# Patient Record
Sex: MALE | Race: WHITE | NOT HISPANIC OR LATINO | Employment: FULL TIME | URBAN - METROPOLITAN AREA
[De-identification: names, ages, dates, MRNs, and addresses within clinical notes are randomized per-mention and may not be internally consistent; named-entity substitution may affect disease eponyms.]

---

## 2017-03-14 DIAGNOSIS — I10 ESSENTIAL HYPERTENSION: ICD-10-CM

## 2017-03-15 DIAGNOSIS — I10 ESSENTIAL HYPERTENSION: ICD-10-CM

## 2017-03-15 RX ORDER — AMLODIPINE BESYLATE 5 MG/1
TABLET ORAL
Qty: 30 TABLET | Refills: 0 | Status: SHIPPED | OUTPATIENT
Start: 2017-03-15 | End: 2017-04-17 | Stop reason: SDUPTHER

## 2017-03-15 RX ORDER — AMLODIPINE BESYLATE 5 MG/1
TABLET ORAL
Qty: 90 TABLET | Refills: 0 | OUTPATIENT
Start: 2017-03-15

## 2017-04-17 DIAGNOSIS — I10 ESSENTIAL HYPERTENSION: ICD-10-CM

## 2017-04-18 RX ORDER — AMLODIPINE BESYLATE 5 MG/1
5 TABLET ORAL DAILY
Qty: 15 TABLET | Refills: 0 | Status: SHIPPED | OUTPATIENT
Start: 2017-04-18 | End: 2017-05-11 | Stop reason: SDUPTHER

## 2017-05-11 ENCOUNTER — OFFICE VISIT (OUTPATIENT)
Dept: INTERNAL MEDICINE | Age: 62
End: 2017-05-11

## 2017-05-11 VITALS
HEART RATE: 60 BPM | HEIGHT: 72 IN | OXYGEN SATURATION: 98 % | WEIGHT: 164 LBS | DIASTOLIC BLOOD PRESSURE: 80 MMHG | SYSTOLIC BLOOD PRESSURE: 124 MMHG | BODY MASS INDEX: 22.21 KG/M2

## 2017-05-11 DIAGNOSIS — I10 ESSENTIAL HYPERTENSION: Primary | Chronic | ICD-10-CM

## 2017-05-11 DIAGNOSIS — K51.90 ULCERATIVE COLITIS WITHOUT COMPLICATIONS, UNSPECIFIED LOCATION (HCC): Chronic | ICD-10-CM

## 2017-05-11 LAB
BUN SERPL-MCNC: 19 MG/DL (ref 6–20)
BUN/CREAT SERPL: 14.6 (ref 7–25)
CALCIUM SERPL-MCNC: 9.8 MG/DL (ref 8.6–10.5)
CHLORIDE SERPL-SCNC: 100 MMOL/L (ref 98–107)
CO2 SERPL-SCNC: 28.8 MMOL/L (ref 22–29)
CREAT SERPL-MCNC: 1.3 MG/DL (ref 0.76–1.27)
GLUCOSE SERPL-MCNC: 89 MG/DL (ref 65–99)
POTASSIUM SERPL-SCNC: 4.3 MMOL/L (ref 3.5–5.2)
SODIUM SERPL-SCNC: 141 MMOL/L (ref 136–145)

## 2017-05-11 PROCEDURE — 99213 OFFICE O/P EST LOW 20 MIN: CPT | Performed by: INTERNAL MEDICINE

## 2017-05-11 RX ORDER — AMLODIPINE BESYLATE 5 MG/1
5 TABLET ORAL DAILY
Qty: 30 TABLET | Refills: 5 | Status: SHIPPED | OUTPATIENT
Start: 2017-05-11 | End: 2017-10-23 | Stop reason: SDUPTHER

## 2017-05-12 ENCOUNTER — TELEPHONE (OUTPATIENT)
Dept: INTERNAL MEDICINE | Age: 62
End: 2017-05-12

## 2017-10-23 DIAGNOSIS — I10 ESSENTIAL HYPERTENSION: Chronic | ICD-10-CM

## 2017-10-24 RX ORDER — AMLODIPINE BESYLATE 5 MG/1
TABLET ORAL
Qty: 90 TABLET | Refills: 0 | Status: SHIPPED | OUTPATIENT
Start: 2017-10-24 | End: 2018-08-08 | Stop reason: SDUPTHER

## 2017-11-20 DIAGNOSIS — I10 ESSENTIAL HYPERTENSION: Chronic | ICD-10-CM

## 2017-11-20 RX ORDER — AMLODIPINE BESYLATE 5 MG/1
5 TABLET ORAL DAILY
Qty: 30 TABLET | Refills: 0 | Status: SHIPPED | OUTPATIENT
Start: 2017-11-20 | End: 2018-01-26 | Stop reason: SDUPTHER

## 2017-11-20 RX ORDER — AMLODIPINE BESYLATE 5 MG/1
5 TABLET ORAL DAILY
Qty: 30 TABLET | Refills: 0 | Status: SHIPPED | OUTPATIENT
Start: 2017-11-20 | End: 2017-11-20 | Stop reason: SDUPTHER

## 2018-01-26 DIAGNOSIS — I10 ESSENTIAL HYPERTENSION: Chronic | ICD-10-CM

## 2018-01-26 RX ORDER — AMLODIPINE BESYLATE 5 MG/1
TABLET ORAL
Qty: 30 TABLET | Refills: 0 | Status: SHIPPED | OUTPATIENT
Start: 2018-01-26 | End: 2018-02-23 | Stop reason: SDUPTHER

## 2018-02-23 DIAGNOSIS — I10 ESSENTIAL HYPERTENSION: Chronic | ICD-10-CM

## 2018-02-23 RX ORDER — AMLODIPINE BESYLATE 5 MG/1
5 TABLET ORAL DAILY
Qty: 30 TABLET | Refills: 0 | Status: SHIPPED | OUTPATIENT
Start: 2018-02-23 | End: 2018-03-26 | Stop reason: SDUPTHER

## 2018-03-26 DIAGNOSIS — I10 ESSENTIAL HYPERTENSION: Chronic | ICD-10-CM

## 2018-03-27 RX ORDER — AMLODIPINE BESYLATE 5 MG/1
TABLET ORAL
Qty: 30 TABLET | Refills: 0 | Status: SHIPPED | OUTPATIENT
Start: 2018-03-27 | End: 2018-04-25 | Stop reason: SDUPTHER

## 2018-04-25 DIAGNOSIS — I10 ESSENTIAL HYPERTENSION: Chronic | ICD-10-CM

## 2018-04-26 RX ORDER — AMLODIPINE BESYLATE 5 MG/1
TABLET ORAL
Qty: 30 TABLET | Refills: 0 | Status: SHIPPED | OUTPATIENT
Start: 2018-04-26 | End: 2018-05-24 | Stop reason: SDUPTHER

## 2018-05-24 DIAGNOSIS — I10 ESSENTIAL HYPERTENSION: Chronic | ICD-10-CM

## 2018-05-25 DIAGNOSIS — I10 ESSENTIAL HYPERTENSION: Chronic | ICD-10-CM

## 2018-05-25 RX ORDER — AMLODIPINE BESYLATE 5 MG/1
TABLET ORAL
Qty: 30 TABLET | Refills: 0 | Status: SHIPPED | OUTPATIENT
Start: 2018-05-25 | End: 2018-07-31 | Stop reason: SDUPTHER

## 2018-05-25 RX ORDER — AMLODIPINE BESYLATE 5 MG/1
TABLET ORAL
Qty: 30 TABLET | Refills: 0 | Status: SHIPPED | OUTPATIENT
Start: 2018-05-25 | End: 2018-05-25 | Stop reason: SDUPTHER

## 2018-06-11 VITALS
RESPIRATION RATE: 13 BRPM | HEART RATE: 78 BPM | SYSTOLIC BLOOD PRESSURE: 117 MMHG | TEMPERATURE: 98.2 F | RESPIRATION RATE: 18 BRPM | HEART RATE: 69 BPM | RESPIRATION RATE: 19 BRPM | RESPIRATION RATE: 12 BRPM | DIASTOLIC BLOOD PRESSURE: 68 MMHG | DIASTOLIC BLOOD PRESSURE: 69 MMHG | DIASTOLIC BLOOD PRESSURE: 79 MMHG | TEMPERATURE: 97.5 F | RESPIRATION RATE: 17 BRPM | HEART RATE: 70 BPM | DIASTOLIC BLOOD PRESSURE: 83 MMHG | DIASTOLIC BLOOD PRESSURE: 77 MMHG | HEART RATE: 76 BPM | DIASTOLIC BLOOD PRESSURE: 82 MMHG | SYSTOLIC BLOOD PRESSURE: 121 MMHG | SYSTOLIC BLOOD PRESSURE: 127 MMHG | HEART RATE: 77 BPM | HEART RATE: 71 BPM | OXYGEN SATURATION: 99 % | OXYGEN SATURATION: 100 % | OXYGEN SATURATION: 95 % | SYSTOLIC BLOOD PRESSURE: 113 MMHG | SYSTOLIC BLOOD PRESSURE: 136 MMHG | HEART RATE: 66 BPM | SYSTOLIC BLOOD PRESSURE: 122 MMHG | SYSTOLIC BLOOD PRESSURE: 108 MMHG | HEART RATE: 72 BPM | RESPIRATION RATE: 14 BRPM | SYSTOLIC BLOOD PRESSURE: 103 MMHG | SYSTOLIC BLOOD PRESSURE: 105 MMHG | DIASTOLIC BLOOD PRESSURE: 65 MMHG | DIASTOLIC BLOOD PRESSURE: 80 MMHG | OXYGEN SATURATION: 96 % | HEART RATE: 75 BPM | DIASTOLIC BLOOD PRESSURE: 71 MMHG | DIASTOLIC BLOOD PRESSURE: 70 MMHG | RESPIRATION RATE: 16 BRPM | OXYGEN SATURATION: 97 % | HEIGHT: 72 IN | SYSTOLIC BLOOD PRESSURE: 106 MMHG | SYSTOLIC BLOOD PRESSURE: 111 MMHG | WEIGHT: 161 LBS | SYSTOLIC BLOOD PRESSURE: 137 MMHG

## 2018-06-13 ENCOUNTER — AMBULATORY SURGICAL CENTER (OUTPATIENT)
Dept: URBAN - METROPOLITAN AREA SURGERY 17 | Facility: SURGERY | Age: 63
End: 2018-06-13
Payer: COMMERCIAL

## 2018-06-13 ENCOUNTER — OFFICE (OUTPATIENT)
Dept: URBAN - METROPOLITAN AREA PATHOLOGY 4 | Facility: PATHOLOGY | Age: 63
End: 2018-06-13
Payer: COMMERCIAL

## 2018-06-13 DIAGNOSIS — K51.00 ULCERATIVE (CHRONIC) PANCOLITIS WITHOUT COMPLICATIONS: ICD-10-CM

## 2018-06-13 DIAGNOSIS — K62.1 RECTAL POLYP: ICD-10-CM

## 2018-06-13 DIAGNOSIS — Z86.010 PERSONAL HISTORY OF COLONIC POLYPS: ICD-10-CM

## 2018-06-13 DIAGNOSIS — K51.40 INFLAMMATORY POLYPS OF COLON WITHOUT COMPLICATIONS: ICD-10-CM

## 2018-06-13 LAB
GI HISTOLOGY: A. UNSPECIFIED: (no result)
GI HISTOLOGY: B. UNSPECIFIED: (no result)
GI HISTOLOGY: C. UNSPECIFIED: (no result)
GI HISTOLOGY: D. UNSPECIFIED: (no result)
GI HISTOLOGY: E. UNSPECIFIED: (no result)
GI HISTOLOGY: F. UNSPECIFIED: (no result)
GI HISTOLOGY: G. UNSPECIFIED: (no result)
GI HISTOLOGY: H. UNSPECIFIED: (no result)
GI HISTOLOGY: I. UNSPECIFIED: (no result)
GI HISTOLOGY: J. UNSPECIFIED: (no result)
GI HISTOLOGY: K. SELECT: (no result)
GI HISTOLOGY: L. UNSPECIFIED: (no result)
GI HISTOLOGY: M. UNSPECIFIED: (no result)
GI HISTOLOGY: PDF REPORT: (no result)

## 2018-06-13 PROCEDURE — 88305 TISSUE EXAM BY PATHOLOGIST: CPT | Performed by: INTERNAL MEDICINE

## 2018-06-13 PROCEDURE — 45380 COLONOSCOPY AND BIOPSY: CPT | Mod: 33 | Performed by: INTERNAL MEDICINE

## 2018-06-13 RX ADMIN — PROPOFOL 50 MG: 10 INJECTION, EMULSION INTRAVENOUS at 13:34

## 2018-06-13 RX ADMIN — PROPOFOL 150 MG: 10 INJECTION, EMULSION INTRAVENOUS at 13:27

## 2018-06-13 RX ADMIN — PROPOFOL 50 MG: 10 INJECTION, EMULSION INTRAVENOUS at 13:31

## 2018-06-13 RX ADMIN — PROPOFOL 50 MG: 10 INJECTION, EMULSION INTRAVENOUS at 13:45

## 2018-07-31 DIAGNOSIS — I10 ESSENTIAL HYPERTENSION: Chronic | ICD-10-CM

## 2018-08-01 RX ORDER — AMLODIPINE BESYLATE 5 MG/1
TABLET ORAL
Qty: 15 TABLET | Refills: 0 | Status: SHIPPED | OUTPATIENT
Start: 2018-08-01 | End: 2018-08-08 | Stop reason: SDUPTHER

## 2018-08-08 ENCOUNTER — OFFICE VISIT (OUTPATIENT)
Dept: INTERNAL MEDICINE | Age: 63
End: 2018-08-08

## 2018-08-08 VITALS
SYSTOLIC BLOOD PRESSURE: 110 MMHG | HEIGHT: 72 IN | BODY MASS INDEX: 22.08 KG/M2 | HEART RATE: 62 BPM | DIASTOLIC BLOOD PRESSURE: 74 MMHG | OXYGEN SATURATION: 98 % | TEMPERATURE: 98.1 F | WEIGHT: 163 LBS

## 2018-08-08 DIAGNOSIS — K51.90 ULCERATIVE COLITIS WITHOUT COMPLICATIONS, UNSPECIFIED LOCATION (HCC): Chronic | ICD-10-CM

## 2018-08-08 DIAGNOSIS — I10 ESSENTIAL HYPERTENSION: Primary | Chronic | ICD-10-CM

## 2018-08-08 LAB
ALBUMIN SERPL-MCNC: 5.2 G/DL (ref 3.5–5.2)
ALBUMIN/GLOB SERPL: 2.7 G/DL
ALP SERPL-CCNC: 54 U/L (ref 39–117)
ALT SERPL-CCNC: 15 U/L (ref 1–41)
AST SERPL-CCNC: 13 U/L (ref 1–40)
BILIRUB SERPL-MCNC: 0.7 MG/DL (ref 0.1–1.2)
BUN SERPL-MCNC: 17 MG/DL (ref 8–23)
BUN/CREAT SERPL: 13.8 (ref 7–25)
CALCIUM SERPL-MCNC: 10.3 MG/DL (ref 8.6–10.5)
CHLORIDE SERPL-SCNC: 100 MMOL/L (ref 98–107)
CO2 SERPL-SCNC: 29.8 MMOL/L (ref 22–29)
CREAT SERPL-MCNC: 1.23 MG/DL (ref 0.76–1.27)
GLOBULIN SER CALC-MCNC: 1.9 GM/DL
GLUCOSE SERPL-MCNC: 87 MG/DL (ref 65–99)
POTASSIUM SERPL-SCNC: 4 MMOL/L (ref 3.5–5.2)
PROT SERPL-MCNC: 7.1 G/DL (ref 6–8.5)
SODIUM SERPL-SCNC: 141 MMOL/L (ref 136–145)

## 2018-08-08 PROCEDURE — 99213 OFFICE O/P EST LOW 20 MIN: CPT | Performed by: INTERNAL MEDICINE

## 2018-08-08 RX ORDER — AMLODIPINE BESYLATE 5 MG/1
5 TABLET ORAL DAILY
Qty: 30 TABLET | Refills: 5 | Status: SHIPPED | OUTPATIENT
Start: 2018-08-08 | End: 2018-12-18 | Stop reason: SDUPTHER

## 2018-08-08 NOTE — PROGRESS NOTES
"[facility] [place] [facility]  BERT PENNINGTON M.D.      Alvin Cruz / 62 y.o. / male  08/08/2018      ASSESSMENT & PLAN:    Problem List Items Addressed This Visit        High    Hypertension - Primary (Chronic)    Overview     Stable. Continue amlodipine 5 mg qd.          Relevant Medications    amLODIPine (NORVASC) 5 MG tablet    Other Relevant Orders    Comprehensive Metabolic Panel       Medium    Ulcerative colitis (CMS/HCC) (Chronic)    Overview     *Dr. Ashely Long. Continue mercaptopurine.              Orders Placed This Encounter   Procedures   • Comprehensive Metabolic Panel     New Medications Ordered This Visit   Medications   • amLODIPine (NORVASC) 5 MG tablet     Sig: Take 1 tablet by mouth Daily.     Dispense:  30 tablet     Refill:  5       Summary/Discussion:      Return in about 4 months (around 12/8/2018) for Annual physical.    ____________________________________________________________________    MEDICATIONS  Current Outpatient Prescriptions   Medication Sig Dispense Refill   • amLODIPine (NORVASC) 5 MG tablet Take 1 tablet by mouth Daily. 30 tablet 5   • Calcium Carbonate-Vitamin D (CALCIUM 600+D3 PO) Take  by mouth.     • cetirizine (ZyrTEC) 10 MG tablet Take 0.5 tablets by mouth daily.     • Cholecalciferol 2000 UNITS capsule Take  by mouth Daily.     • mercaptopurine (PURINETHOL) 50 MG chemo tablet Take 1.5 tablets by mouth daily.     • Probiotic Product (SUPER PROBIOTIC) capsule Take  by mouth.       No current facility-administered medications for this visit.          VITALS:    Visit Vitals  /74 (BP Location: Left arm, Patient Position: Sitting, Cuff Size: Adult)   Pulse 62   Temp 98.1 °F (36.7 °C) (Temporal Artery )   Ht 182.9 cm (72\")   Wt 73.9 kg (163 lb)   SpO2 98%   BMI 22.11 kg/m²       BP Readings from Last 3 Encounters:   08/08/18 110/74   05/11/17 124/80   09/29/16 128/70     Wt Readings from Last 3 Encounters:   08/08/18 73.9 kg (163 lb)   05/11/17 74.4 kg (164 lb) "   09/29/16 77.1 kg (170 lb)      Body mass index is 22.11 kg/m².    CC:  Main reason(s) for today's visit: Hypertension      HPI:     Chronic essential hypertension:  Since prior visit: compliant with medication(s) and denies significant problems with medication(s).  Most recent in-office blood pressure readings:   BP Readings from Last 3 Encounters:   08/08/18 110/74   05/11/17 124/80   09/29/16 128/70     Ulcerative colitis is stable on mercaptopurine. Followed by GI.     Patient Care Team:  Jcarlos Tucker MD as PCP - General  Donnell Lund MD as Consulting Physician (Gastroenterology)    ____________________________________________________________________    REVIEW OF SYSTEMS    Review of Systems  Constitutional neg  Resp neg  CV neg  GI UC is stable  Neuro neg    PHYSICAL EXAMINATION    Physical Exam  Constitutional  No distress  Cardiovascular Rate  normal . Rhythm: regular . Heart sounds:  Normal. No edema of ankles.   Pulmonary/Chest  Effort normal. Breath sounds:  Normal  Psychiatric  Alert. Judgment and thought content normal. Mood normal    REVIEWED DATA:    Labs:     Lab Results   Component Value Date     05/11/2017    K 4.3 05/11/2017    AST 15 02/01/2016    ALT 14 02/01/2016    BUN 19 05/11/2017    CREATININE 1.30 (H) 05/11/2017    CREATININE 1.20 02/01/2016    EGFRIFNONA 57 (L) 05/11/2017    EGFRIFAFRI 68 05/11/2017       Lab Results   Component Value Date    HGBA1C 5.2 02/01/2016       Lab Results   Component Value Date    LDL 80 02/01/2016    HDL 58 02/01/2016    TRIG 62 02/01/2016    CHOLHDLRATIO 2.6 02/01/2016       Lab Results   Component Value Date    TSH 2.43 02/01/2016    FREET4 1.17 02/01/2016       Lab Results   Component Value Date    WBC 3.85 (L) 02/01/2016    HGB 15.8 02/01/2016     02/01/2016       Imaging:         Medical Tests:         Summary of old records / correspondence / consultant report:         Request outside records:         ALLERGIES  Allergies   Allergen  Reactions   • Codeine    • Infliximab         PFSH:     The following portions of the patient's history were reviewed and updated as appropriate: Allergies / Current Medications / Past Medical History / Surgical History / Social History / Family History    PROBLEM LIST   Patient Active Problem List   Diagnosis   • Atopic rhinitis   • Hypertension   • Insomnia   • Ulcerative colitis (CMS/HCC)       PAST MEDICAL HISTORY  Past Medical History:   Diagnosis Date   • Allergic rhinitis    • Dyslipidemia    • Hypertension        SURGICAL HISTORY  Past Surgical History:   Procedure Laterality Date   • APPENDECTOMY         SOCIAL HISTORY  Social History     Social History   • Marital status: Single   • Number of children: 0     Occupational History   •  (Online)      Social History Main Topics   • Smoking status: Former Smoker     Years: 12.00     Quit date: 1/1/1988   • Smokeless tobacco: Never Used   • Alcohol use Yes      Comment: occasional   • Drug use: No   • Sexual activity: Yes     Other Topics Concern   • Not on file       FAMILY HISTORY  Family History   Problem Relation Age of Onset   • Heart attack Mother    • Coronary artery disease Mother    • Diabetes Mother    • Hypertension Father    • Parkinsonism Father    • Multiple myeloma Brother          **Dragon Disclaimer:   Much of this encounter note is an electronic transcription/translation of spoken language to printed text. The electronic translation of spoken language may permit erroneous, or at times, nonsensical words or phrases to be inadvertently transcribed. Although I have reviewed the note for such errors, some may still exist.

## 2018-12-18 ENCOUNTER — OFFICE (OUTPATIENT)
Dept: URBAN - METROPOLITAN AREA CLINIC 75 | Facility: CLINIC | Age: 63
End: 2018-12-18

## 2018-12-18 VITALS
WEIGHT: 170 LBS | HEIGHT: 72 IN | DIASTOLIC BLOOD PRESSURE: 80 MMHG | HEART RATE: 75 BPM | SYSTOLIC BLOOD PRESSURE: 120 MMHG

## 2018-12-18 DIAGNOSIS — K51.90 ULCERATIVE COLITIS, UNSPECIFIED, WITHOUT COMPLICATIONS: ICD-10-CM

## 2018-12-18 DIAGNOSIS — Z92.25 PERSONAL HISTORY OF IMMUNOSUPPRESSION THERAPY: ICD-10-CM

## 2018-12-18 DIAGNOSIS — Z86.010 PERSONAL HISTORY OF COLONIC POLYPS: ICD-10-CM

## 2018-12-18 DIAGNOSIS — I10 ESSENTIAL HYPERTENSION: Chronic | ICD-10-CM

## 2018-12-18 PROCEDURE — 99214 OFFICE O/P EST MOD 30 MIN: CPT | Performed by: INTERNAL MEDICINE

## 2018-12-19 RX ORDER — AMLODIPINE BESYLATE 5 MG/1
5 TABLET ORAL DAILY
Qty: 90 TABLET | Refills: 2 | Status: SHIPPED | OUTPATIENT
Start: 2018-12-19 | End: 2019-09-17 | Stop reason: SDUPTHER

## 2019-02-01 DIAGNOSIS — Z00.00 PREVENTATIVE HEALTH CARE: Primary | ICD-10-CM

## 2019-02-05 LAB
ALBUMIN SERPL-MCNC: 5 G/DL (ref 3.5–5.2)
ALBUMIN/GLOB SERPL: 2.4 G/DL
ALP SERPL-CCNC: 50 U/L (ref 39–117)
ALT SERPL-CCNC: 18 U/L (ref 1–41)
APPEARANCE UR: CLEAR
AST SERPL-CCNC: 17 U/L (ref 1–40)
BASOPHILS # BLD AUTO: 0.06 10*3/MM3 (ref 0–0.2)
BASOPHILS NFR BLD AUTO: 1.3 % (ref 0–1.5)
BILIRUB SERPL-MCNC: 0.6 MG/DL (ref 0.1–1.2)
BILIRUB UR QL STRIP: NEGATIVE
BUN SERPL-MCNC: 15 MG/DL (ref 8–23)
BUN/CREAT SERPL: 11.6 (ref 7–25)
CALCIUM SERPL-MCNC: 10.1 MG/DL (ref 8.6–10.5)
CHLORIDE SERPL-SCNC: 98 MMOL/L (ref 98–107)
CHOLEST SERPL-MCNC: 157 MG/DL (ref 0–200)
CHOLEST/HDLC SERPL: 3.02 {RATIO}
CO2 SERPL-SCNC: 26.3 MMOL/L (ref 22–29)
COLOR UR: YELLOW
CREAT SERPL-MCNC: 1.29 MG/DL (ref 0.76–1.27)
EOSINOPHIL # BLD AUTO: 0.23 10*3/MM3 (ref 0–0.7)
EOSINOPHIL NFR BLD AUTO: 4.9 % (ref 0.3–6.2)
ERYTHROCYTE [DISTWIDTH] IN BLOOD BY AUTOMATED COUNT: 12.7 % (ref 11.5–14.5)
GLOBULIN SER CALC-MCNC: 2.1 GM/DL
GLUCOSE SERPL-MCNC: 87 MG/DL (ref 65–99)
GLUCOSE UR QL: NEGATIVE
HBA1C MFR BLD: 5.2 % (ref 4.8–5.6)
HCT VFR BLD AUTO: 49.2 % (ref 40.4–52.2)
HCV AB S/CO SERPL IA: <0.1 S/CO RATIO (ref 0–0.9)
HDLC SERPL-MCNC: 52 MG/DL (ref 40–60)
HGB BLD-MCNC: 16.6 G/DL (ref 13.7–17.6)
HGB UR QL STRIP: NEGATIVE
IMM GRANULOCYTES # BLD AUTO: 0.02 10*3/MM3 (ref 0–0.03)
IMM GRANULOCYTES NFR BLD AUTO: 0.4 % (ref 0–0.5)
KETONES UR QL STRIP: NEGATIVE
LDLC SERPL CALC-MCNC: 87 MG/DL (ref 0–100)
LEUKOCYTE ESTERASE UR QL STRIP: NEGATIVE
LYMPHOCYTES # BLD AUTO: 1.34 10*3/MM3 (ref 0.9–4.8)
LYMPHOCYTES NFR BLD AUTO: 28.6 % (ref 19.6–45.3)
MCH RBC QN AUTO: 32.7 PG (ref 27–32.7)
MCHC RBC AUTO-ENTMCNC: 33.7 G/DL (ref 32.6–36.4)
MCV RBC AUTO: 97 FL (ref 79.8–96.2)
MONOCYTES # BLD AUTO: 0.58 10*3/MM3 (ref 0.2–1.2)
MONOCYTES NFR BLD AUTO: 12.4 % (ref 5–12)
NEUTROPHILS # BLD AUTO: 2.48 10*3/MM3 (ref 1.9–8.1)
NEUTROPHILS NFR BLD AUTO: 52.8 % (ref 42.7–76)
NITRITE UR QL STRIP: NEGATIVE
PH UR STRIP: 7 [PH] (ref 5–8)
PLATELET # BLD AUTO: 191 10*3/MM3 (ref 140–500)
POTASSIUM SERPL-SCNC: 4.6 MMOL/L (ref 3.5–5.2)
PROT SERPL-MCNC: 7.1 G/DL (ref 6–8.5)
PROT UR QL STRIP: NEGATIVE
PSA SERPL-MCNC: 1.4 NG/ML (ref 0–4)
RBC # BLD AUTO: 5.07 10*6/MM3 (ref 4.6–6)
SODIUM SERPL-SCNC: 137 MMOL/L (ref 136–145)
SP GR UR: NORMAL (ref 1–1.03)
T4 FREE SERPL-MCNC: 1.24 NG/DL (ref 0.93–1.7)
TRIGL SERPL-MCNC: 90 MG/DL (ref 0–150)
TSH SERPL DL<=0.005 MIU/L-ACNC: 3.69 MIU/ML (ref 0.27–4.2)
UROBILINOGEN UR STRIP-MCNC: NORMAL MG/DL
VLDLC SERPL CALC-MCNC: 18 MG/DL (ref 5–40)
WBC # BLD AUTO: 4.69 10*3/MM3 (ref 4.5–10.7)

## 2019-02-11 ENCOUNTER — OFFICE VISIT (OUTPATIENT)
Dept: INTERNAL MEDICINE | Age: 64
End: 2019-02-11

## 2019-02-11 VITALS
DIASTOLIC BLOOD PRESSURE: 74 MMHG | OXYGEN SATURATION: 97 % | SYSTOLIC BLOOD PRESSURE: 120 MMHG | BODY MASS INDEX: 23.03 KG/M2 | WEIGHT: 170 LBS | HEART RATE: 74 BPM | TEMPERATURE: 98.3 F | HEIGHT: 72 IN

## 2019-02-11 DIAGNOSIS — Z00.00 ENCOUNTER FOR ANNUAL HEALTH EXAMINATION: Primary | ICD-10-CM

## 2019-02-11 DIAGNOSIS — K51.90 ULCERATIVE COLITIS WITHOUT COMPLICATIONS, UNSPECIFIED LOCATION (HCC): Chronic | ICD-10-CM

## 2019-02-11 DIAGNOSIS — I10 ESSENTIAL HYPERTENSION: Chronic | ICD-10-CM

## 2019-02-11 PROCEDURE — 99396 PREV VISIT EST AGE 40-64: CPT | Performed by: INTERNAL MEDICINE

## 2019-02-11 NOTE — PROGRESS NOTES
[facility] [place] [facility]  BERT PENNINGTON M.D.      Alvin Barreraring / 63 y.o. / male  02/11/2019    CC: Annual Exam (last 3/16/2016)      HPI:      Alvin presents for annual health maintenance visit.  Chronic essential hypertension:  Since prior visit: compliant with medication(s) and denies significant problems with medication(s).  Most recent in-office blood pressure readings:   BP Readings from Last 3 Encounters:   02/11/19 120/74   08/08/18 110/74   05/11/17 124/80      · Last health maintenance visit: 3 years ago  · General health: good  · Lifestyle:  · Attempting to lose weight?: No   · Diet: adequate/fair  · Exercise: adequate/fair  · Tobacco: Former smoker   · Alcohol: regular (moderate)  · Work: Full-time  · Reproductive health:  · Sexually active?: Yes   · Concern for STD?: No   · Sexual problems?: No problems   · Sees Urologist?: No   · Depression Screening:      PHQ-2/PHQ-9 Depression Screening 2/11/2019   Little interest or pleasure in doing things 0   Feeling down, depressed, or hopeless 0   Total Score 0   If you checked off any problems, how difficult have these problems made it for you to do your work, take care of things at home, or get along with other people? -         PHQ-2: 0 (Not depressed)     PHQ-9:     Patient Care Team:  Jcarlos Pennington MD as PCP - General  Donnell Lund MD as Consulting Physician (Gastroenterology)  ______________________________________________________________________    ALLERGIES  Allergies   Allergen Reactions   • Codeine    • Infliximab         MEDICATIONS  Current Outpatient Medications on File Prior to Visit   Medication Sig   • amLODIPine (NORVASC) 5 MG tablet TAKE 1 TABLET BY MOUTH DAILY   • Calcium Carbonate-Vitamin D (CALCIUM 600+D3 PO) Take  by mouth.   • cetirizine (ZyrTEC) 10 MG tablet Take 0.5 tablets by mouth daily.   • Cholecalciferol 2000 UNITS capsule Take  by mouth Daily.   • mercaptopurine (PURINETHOL) 50 MG chemo tablet Take 1.5 tablets by mouth daily.   •  Probiotic Product (SUPER PROBIOTIC) capsule Take  by mouth.     No current facility-administered medications on file prior to visit.        PFSH:     The following portions of the patient's history were reviewed and updated as appropriate: Allergies / Current Medications / Past Medical History / Surgical History / Social History / Family History    PROBLEM LIST   Patient Active Problem List   Diagnosis   • Atopic rhinitis   • Hypertension   • Insomnia   • Ulcerative colitis (CMS/HCC)       PAST MEDICAL HISTORY  Past Medical History:   Diagnosis Date   • Allergic rhinitis    • Dyslipidemia    • Hypertension        SURGICAL HISTORY  Past Surgical History:   Procedure Laterality Date   • APPENDECTOMY         SOCIAL HISTORY  Social History     Socioeconomic History   • Marital status: Single     Spouse name: Not on file   • Number of children: 0   • Years of education: Not on file   • Highest education level: Not on file   Occupational History   • Occupation:  (Online)   Tobacco Use   • Smoking status: Former Smoker     Years: 12.     Last attempt to quit: 1988     Years since quittin.   • Smokeless tobacco: Never Used   Substance and Sexual Activity   • Alcohol use: Yes     Frequency: 2-3 times a week     Drinks per session: 1 or 2   • Drug use: No   • Sexual activity: Yes       FAMILY HISTORY  Family History   Problem Relation Age of Onset   • Heart attack Mother    • Coronary artery disease Mother    • Diabetes Mother    • Hypertension Father    • Parkinsonism Father    • Multiple myeloma Brother    • No Known Problems Sister    • No Known Problems Brother    • Colon cancer Neg Hx    • Prostate cancer Neg Hx        IMMUNIZATION HISTORY  Immunization History   Administered Date(s) Administered   • FLUARIX/FLUZONE/AFLURIA/FLULAVAL QUAD 2019   • Flu Mist 03/10/2011, 10/16/2015   • Pneumococcal Polysaccharide (PPSV23) 03/10/2011   • Shingrix 2018   • Td 03/10/2011  "      ______________________________________________________________________    REVIEW OF SYSTEMS    Review of Systems   Constitutional: Negative.    HENT: Negative.    Eyes: Negative.    Respiratory: Negative.    Cardiovascular: Negative.    Gastrointestinal: Negative.    Endocrine: Negative.    Genitourinary: Negative.    Musculoskeletal: Negative.    Skin: Negative.    Allergic/Immunologic: Negative.    Neurological: Negative.    Hematological: Negative.    Psychiatric/Behavioral: Negative.          VITALS:    Visit Vitals  /74   Pulse 74   Temp 98.3 °F (36.8 °C)   Ht 182.9 cm (72.01\")   Wt 77.1 kg (170 lb)   SpO2 97%   BMI 23.05 kg/m²       BP Readings from Last 3 Encounters:   02/11/19 120/74   08/08/18 110/74   05/11/17 124/80     Wt Readings from Last 3 Encounters:   02/11/19 77.1 kg (170 lb)   08/08/18 73.9 kg (163 lb)   05/11/17 74.4 kg (164 lb)      Body mass index is 23.05 kg/m².    PHYSICAL EXAMINATION    Physical Exam   Constitutional: He is oriented to person, place, and time. He appears well-nourished. No distress.   HENT:   Head: Normocephalic.   Right Ear: External ear normal.   Left Ear: External ear normal.   Nose: Nose normal.   Mouth/Throat: Oropharynx is clear and moist.   Eyes: Conjunctivae and EOM are normal. Pupils are equal, round, and reactive to light. No scleral icterus.   Neck: Normal range of motion. Neck supple. No tracheal deviation present. No thyromegaly present.   Cardiovascular: Normal rate, regular rhythm, normal heart sounds and intact distal pulses. Exam reveals no gallop and no friction rub.   No murmur heard.  Pulmonary/Chest: Effort normal and breath sounds normal.   Abdominal: Soft. Normal appearance and bowel sounds are normal. He exhibits no distension and no mass. There is no hepatosplenomegaly. There is no tenderness. No hernia.   Genitourinary: Prostate normal, testes normal and penis normal.   Musculoskeletal: Normal range of motion. He exhibits no edema or " deformity.   Lymphadenopathy:     He has no cervical adenopathy.     He has no axillary adenopathy.        Right: No inguinal and no supraclavicular adenopathy present.        Left: No inguinal and no supraclavicular adenopathy present.   Neurological: He is alert and oriented to person, place, and time. He has normal reflexes. He displays normal reflexes. No cranial nerve deficit. He exhibits normal muscle tone. Coordination normal.   Skin: Skin is intact. No lesion (Negative for suspicious skin lesions/growths) and no rash noted. No cyanosis or erythema. No pallor. Nails show no clubbing.   No jaundice  No suspicious skin lesions.    Psychiatric: He has a normal mood and affect. His behavior is normal. Judgment and thought content normal. Cognition and memory are normal.         REVIEWED DATA    Labs:    Lab Results   Component Value Date     02/04/2019    K 4.6 02/04/2019    AST 17 02/04/2019    ALT 18 02/04/2019    BUN 15 02/04/2019    CREATININE 1.29 (H) 02/04/2019    CREATININE 1.23 08/08/2018    CREATININE 1.30 (H) 05/11/2017    EGFRIFNONA 57 (L) 02/04/2019    EGFRIFAFRI 69 02/04/2019       Lab Results   Component Value Date    HGBA1C 5.20 02/04/2019    HGBA1C 5.2 02/01/2016    TSH 3.690 02/04/2019    FREET4 1.24 02/04/2019       Lab Results   Component Value Date    PSA 1.400 02/04/2019    PSA 1.22 02/01/2016    TESTOSTEROTT 350 02/01/2016       Lab Results   Component Value Date    LDL 87 02/04/2019    HDL 52 02/04/2019    TRIG 90 02/04/2019    CHOLHDLRATIO 3.02 02/04/2019       No components found for: [id number]    Lab Results   Component Value Date    WBC 4.69 02/04/2019    HGB 16.6 02/04/2019    MCV 97.0 (H) 02/04/2019     02/04/2019       Lab Results   Component Value Date    PROTEIN Negative 02/04/2019    GLUCOSEU Negative 02/04/2019    BLOODU Negative 02/04/2019    NITRITEU Negative 02/04/2019    LEUKOCYTESUR Negative 02/04/2019          Lab Results   Component Value Date    HEPCVIRUSABY  <0.1 02/04/2019       Imaging:           Medical Tests:       ______________________________________________________________________    ASSESSMENT & PLAN    ANNUAL WELLNESS EXAM / PHYSICAL     Other medical problems addressed today:  Problem List Items Addressed This Visit        High    Hypertension (Chronic)    Overview     Stable. Continue amlodipine 5 mg qd.          Relevant Medications    amLODIPine (NORVASC) 5 MG tablet       Medium    Ulcerative colitis (CMS/HCC) (Chronic)    Overview     *Dr. Lund    Stable. Continue mercaptopurine.            Other Visit Diagnoses     Encounter for annual health examination    -  Primary          Summary/Discussion:     ·       Return in about 6 months (around 8/11/2019) for Hypertension.    No future appointments.      HEALTHCARE MAINTENANCE ISSUES:    Cancer Screening:  · Colon: Initial/Next screening at age: CURRENT  · Repeat colon cancer screening: every 3 years  · Prostate: Performed today and recommended annual screening  · Testicular: Recommended monthly self exam  · Skin: Monthly self skin examination, annual exam by health professional  · Lung:   · Other:    Screening Labs & Tests:  · Lab results reviewed & discussed with with patient or orders placed today.  · EKG:  · Vascular Screening:   · DEXA (75+ or risk factors):   · HEP C (If born [birth year]201[birth year]5808 or risk factors): Negative screen    Immunization/Vaccinations (to be given today unless deferred by patient)  · Influenza: Patient had the flu shot this season  · Hepatitis A: Recommended at pharmacy  · Tetanus/Pertussis: Up to date  · Pneumovax: Not needed at this time  · Prevnar 13: Not needed at this time  · Shingles: Had 1st Shingrix, advised to complete series  · Other:     Lifestyle Counseling:  · Lifestyle Modifications: Follow a low fat, low cholesterol diet and Continue to abstain from smoking  · Safety Issues: Always wear seatbelt, Avoid texting while driving   · Use sunscreen, regular skin  examination  · Recommended annual dental/vision examination.  · Emotional/Stress/Sleep: Reviewed and  given when appropriate      Health Maintenance   Topic Date Due   • ZOSTER VACCINE (2 of 2) 02/14/2019   • PROSTATE CANCER SCREENING  02/04/2020   • ANNUAL PHYSICAL  02/12/2020   • TDAP/TD VACCINES (2 - Td) 03/11/2021   • COLONOSCOPY  06/13/2021   • INFLUENZA VACCINE  Addressed   • HEPATITIS C SCREENING  Discontinued         **Dragon Disclaimer:   Much of this encounter note is an electronic transcription/translation of spoken language to printed text. The electronic translation of spoken language may permit erroneous, or at times, nonsensical words or phrases to be inadvertently transcribed. Although I have reviewed the note for such errors, some may still exist.       Template created by Lucie Tucker MD

## 2019-06-18 ENCOUNTER — OFFICE (OUTPATIENT)
Dept: URBAN - METROPOLITAN AREA CLINIC 75 | Facility: CLINIC | Age: 64
End: 2019-06-18

## 2019-06-18 VITALS
HEIGHT: 72 IN | DIASTOLIC BLOOD PRESSURE: 98 MMHG | SYSTOLIC BLOOD PRESSURE: 140 MMHG | WEIGHT: 168 LBS | HEART RATE: 70 BPM

## 2019-06-18 DIAGNOSIS — K51.00 ULCERATIVE (CHRONIC) PANCOLITIS WITHOUT COMPLICATIONS: ICD-10-CM

## 2019-06-18 DIAGNOSIS — K51.40 INFLAMMATORY POLYPS OF COLON WITHOUT COMPLICATIONS: ICD-10-CM

## 2019-06-18 DIAGNOSIS — Z86.010 PERSONAL HISTORY OF COLONIC POLYPS: ICD-10-CM

## 2019-06-18 DIAGNOSIS — Z92.25 PERSONAL HISTORY OF IMMUNOSUPPRESSION THERAPY: ICD-10-CM

## 2019-06-18 PROCEDURE — 99214 OFFICE O/P EST MOD 30 MIN: CPT | Performed by: INTERNAL MEDICINE

## 2019-06-18 RX ORDER — MERCAPTOPURINE 50 MG/1
TABLET ORAL
Qty: 45 | Refills: 1 | Status: ACTIVE

## 2019-08-14 ENCOUNTER — OFFICE VISIT (OUTPATIENT)
Dept: INTERNAL MEDICINE | Age: 64
End: 2019-08-14

## 2019-08-14 VITALS
SYSTOLIC BLOOD PRESSURE: 132 MMHG | WEIGHT: 167.2 LBS | HEIGHT: 72 IN | OXYGEN SATURATION: 98 % | BODY MASS INDEX: 22.65 KG/M2 | DIASTOLIC BLOOD PRESSURE: 86 MMHG | TEMPERATURE: 97.1 F | HEART RATE: 69 BPM

## 2019-08-14 DIAGNOSIS — K51.90 ULCERATIVE COLITIS WITHOUT COMPLICATIONS, UNSPECIFIED LOCATION (HCC): Chronic | ICD-10-CM

## 2019-08-14 DIAGNOSIS — I10 ESSENTIAL HYPERTENSION: Primary | Chronic | ICD-10-CM

## 2019-08-14 DIAGNOSIS — F51.01 PRIMARY INSOMNIA: Chronic | ICD-10-CM

## 2019-08-14 PROCEDURE — 99214 OFFICE O/P EST MOD 30 MIN: CPT | Performed by: INTERNAL MEDICINE

## 2019-08-14 NOTE — ASSESSMENT & PLAN NOTE
· Recommended Calm iphone alicia for sleep  · Educational handout given for sleep hygiene  · Discussed trazodone but he defers any new medication for now

## 2019-08-14 NOTE — ASSESSMENT & PLAN NOTE
BP Readings from Last 3 Encounters:   08/14/19 132/86   02/11/19 120/74   08/08/18 110/74      Little higher here. Monitor home blood pressure readings.  Continue amlodipine 5 mg qd for now. Maintain low sodium diet of less than 3 gm.

## 2019-08-14 NOTE — PROGRESS NOTES
[facility] [place]  BERT PENNINGTON M.D.      Alvin Cruz / 63 y.o. / male  08/14/2019      CHIEF COMPLAINT     Hypertension (6 MO F/U)      ASSESSMENT & PLAN     Problem List Items Addressed This Visit        High    Hypertension - Primary (Chronic)    Overview     Continue amlodipine 5 mg qd.          Current Assessment & Plan     BP Readings from Last 3 Encounters:   08/14/19 132/86   02/11/19 120/74   08/08/18 110/74      Little higher here. Monitor home blood pressure readings.  Continue amlodipine 5 mg qd for now. Maintain low sodium diet of less than 3 gm.           Relevant Medications    amLODIPine (NORVASC) 5 MG tablet    Insomnia (Chronic)    Current Assessment & Plan     · Recommended Calm iphone alicia for sleep  · Educational handout given for sleep hygiene  · Discussed trazodone but he defers any new medication for now            Medium    Ulcerative colitis (CMS/HCC) (Chronic)    Overview     *Dr. Ashely Long. Continue mercaptopurine.              No orders of the defined types were placed in this encounter.    No orders of the defined types were placed in this encounter.      Summary/Discussion:  ·       Next Appointment with me: Visit date not found    Return in about 6 months (around 2/14/2020) for Hypertension, Insomnia.      MEDICATIONS     Current Outpatient Medications   Medication Sig Dispense Refill   • amLODIPine (NORVASC) 5 MG tablet TAKE 1 TABLET BY MOUTH DAILY 90 tablet 2   • Calcium Carbonate-Vitamin D (CALCIUM 600+D3 PO) Take  by mouth.     • cetirizine (ZyrTEC) 10 MG tablet Take 0.5 tablets by mouth daily.     • Cholecalciferol 2000 UNITS capsule Take  by mouth Daily.     • mercaptopurine (PURINETHOL) 50 MG chemo tablet Take 1.5 tablets by mouth daily.     • Probiotic Product (SUPER PROBIOTIC) capsule Take  by mouth.       No current facility-administered medications for this visit.           VITAL SIGNS     Visit Vitals  /86   Pulse 69   Temp 97.1 °F (36.2 °C)   Ht 182.9 cm  "(72.01\")   Wt 75.8 kg (167 lb 3.2 oz)   SpO2 98%   BMI 22.67 kg/m²       BP Readings from Last 3 Encounters:   08/14/19 132/86   02/11/19 120/74   08/08/18 110/74     Wt Readings from Last 3 Encounters:   08/14/19 75.8 kg (167 lb 3.2 oz)   02/11/19 77.1 kg (170 lb)   08/08/18 73.9 kg (163 lb)      Body mass index is 22.67 kg/m².      HISTORY OF PRESENT ILLNESS     Chronic essential hypertension:  Since prior visit: compliant with medication(s), does not check blood pressure at home and denies significant problems with medication(s).  Most recent in-office blood pressure readings:   BP Readings from Last 3 Encounters:   08/14/19 132/86   02/11/19 120/74   08/08/18 110/74     Chronic insomnia: Many years ago we managed to get him off Xanax. Has difficulty shutting off his mind at bedtime. Has deferred other medical treatments. No depression.     Ulcerative colitis is stable on mercaptopurine followed by GI.       Patient Care Team:  Jcarlos Tucker MD as PCP - General  Donnell Lund MD as Consulting Physician (Gastroenterology)      REVIEW OF SYSTEMS     Review of Systems  Constitutional neg  Resp neg  CV neg  GI stable UC  Psych insomnia; no depression     PHYSICAL EXAMINATION     Physical Exam  Constitutional  No distress  Cardiovascular Rate  normal . Rhythm: regular . Heart sounds:  Normal  Pulmonary/Chest: Effort normal and breath sounds normal.    Psychiatric  Alert. Judgment and thought content normal. Mood normal      REVIEWED DATA     Labs:     Lab Results   Component Value Date     02/04/2019    K 4.6 02/04/2019    CALCIUM 10.1 02/04/2019    AST 17 02/04/2019    ALT 18 02/04/2019    BUN 15 02/04/2019    CREATININE 1.29 (H) 02/04/2019    CREATININE 1.23 08/08/2018    CREATININE 1.30 (H) 05/11/2017    EGFRIFNONA 57 (L) 02/04/2019    EGFRIFAFRI 69 02/04/2019       Lab Results   Component Value Date    HGBA1C 5.20 02/04/2019    HGBA1C 5.2 02/01/2016    GLU 87 02/04/2019    GLU 87 08/08/2018    GLU 89 " 2017       Lab Results   Component Value Date    LDL 87 2019    LDL 80 2016    HDL 52 2019    HDL 58 2016    TRIG 90 2019    TRIG 62 2016    CHOLHDLRATIO 3.02 2019    CHOLHDLRATIO 2.6 2016       Lab Results   Component Value Date    TSH 3.690 2019    FREET4 1.24 2019       Lab Results   Component Value Date    WBC 4.69 2019    HGB 16.6 2019    HGB 15.8 2016     2019       Lab Results   Component Value Date    PROTEIN Negative 2019    GLUCOSEU Negative 2019    BLOODU Negative 2019    NITRITEU Negative 2019    LEUKOCYTESUR Negative 2019       Imaging:         Medical Tests:         Summary of old records / correspondence / consultant report:         Request outside records:         ALLERGIES  Allergies   Allergen Reactions   • Codeine    • Infliximab         PFSH:     The following portions of the patient's history were reviewed and updated as appropriate: Allergies / Current Medications / Past Medical History / Surgical History / Social History / Family History    PROBLEM LIST   Patient Active Problem List   Diagnosis   • Atopic rhinitis   • Hypertension   • Insomnia   • Ulcerative colitis (CMS/[facility])       PAST MEDICAL HISTORY  Past Medical History:   Diagnosis Date   • Allergic rhinitis    • Dyslipidemia    • Hypertension        SURGICAL HISTORY  Past Surgical History:   Procedure Laterality Date   • APPENDECTOMY         SOCIAL HISTORY  Social History     Socioeconomic History   • Marital status: Single     Spouse name: Not on file   • Number of children: 0   • Years of education: Not on file   • Highest education level: Not on file   Occupational History   • Occupation:  (Online)   Tobacco Use   • Smoking status: Former Smoker     Years: 12.00     Last attempt to quit: 1988     Years since quittin.6   • Smokeless tobacco: Never Used   Substance and Sexual Activity   • Alcohol  use: Yes     Frequency: 2-3 times a week     Drinks per session: 1 or 2   • Drug use: No   • Sexual activity: Yes   Lifestyle   • Physical activity:     Days per week: 4 days     Minutes per session: 30 min   • Stress: To some extent       FAMILY HISTORY  Family History   Problem Relation Age of Onset   • Heart attack Mother    • Coronary artery disease Mother    • Diabetes Mother    • Hypertension Father    • Parkinsonism Father    • Multiple myeloma Brother    • No Known Problems Sister    • No Known Problems Brother    • Colon cancer Neg Hx    • Prostate cancer Neg Hx          **Dragon Disclaimer:   Much of this encounter note is an electronic transcription/translation of spoken language to printed text. The electronic translation of spoken language may permit erroneous, or at times, nonsensical words or phrases to be inadvertently transcribed. Although I have reviewed the note for such errors, some may still exist.       Template created by Lucie Tucker MD

## 2019-09-17 DIAGNOSIS — I10 ESSENTIAL HYPERTENSION: Chronic | ICD-10-CM

## 2019-09-17 RX ORDER — AMLODIPINE BESYLATE 5 MG/1
5 TABLET ORAL DAILY
Qty: 90 TABLET | Refills: 0 | Status: SHIPPED | OUTPATIENT
Start: 2019-09-17 | End: 2019-12-20

## 2019-12-02 ENCOUNTER — OFFICE (OUTPATIENT)
Dept: URBAN - METROPOLITAN AREA CLINIC 75 | Facility: CLINIC | Age: 64
End: 2019-12-02

## 2019-12-02 VITALS
OXYGEN SATURATION: 96 % | HEART RATE: 74 BPM | DIASTOLIC BLOOD PRESSURE: 78 MMHG | HEIGHT: 72 IN | SYSTOLIC BLOOD PRESSURE: 130 MMHG | WEIGHT: 174 LBS

## 2019-12-02 DIAGNOSIS — Z86.010 PERSONAL HISTORY OF COLONIC POLYPS: ICD-10-CM

## 2019-12-02 DIAGNOSIS — K51.00 ULCERATIVE (CHRONIC) PANCOLITIS WITHOUT COMPLICATIONS: ICD-10-CM

## 2019-12-02 DIAGNOSIS — Z92.25 PERSONAL HISTORY OF IMMUNOSUPPRESSION THERAPY: ICD-10-CM

## 2019-12-02 PROCEDURE — 99214 OFFICE O/P EST MOD 30 MIN: CPT | Performed by: INTERNAL MEDICINE

## 2019-12-19 DIAGNOSIS — I10 ESSENTIAL HYPERTENSION: Chronic | ICD-10-CM

## 2019-12-20 RX ORDER — AMLODIPINE BESYLATE 5 MG/1
5 TABLET ORAL DAILY
Qty: 90 TABLET | Refills: 3 | Status: SHIPPED | OUTPATIENT
Start: 2019-12-20 | End: 2020-12-22

## 2020-02-12 ENCOUNTER — OFFICE VISIT (OUTPATIENT)
Dept: INTERNAL MEDICINE | Age: 65
End: 2020-02-12

## 2020-02-12 VITALS
SYSTOLIC BLOOD PRESSURE: 134 MMHG | HEIGHT: 72 IN | OXYGEN SATURATION: 99 % | BODY MASS INDEX: 23.16 KG/M2 | WEIGHT: 171 LBS | HEART RATE: 68 BPM | DIASTOLIC BLOOD PRESSURE: 80 MMHG | TEMPERATURE: 97.6 F

## 2020-02-12 DIAGNOSIS — I10 ESSENTIAL HYPERTENSION: Primary | Chronic | ICD-10-CM

## 2020-02-12 DIAGNOSIS — G89.29 CHRONIC LOW BACK PAIN WITHOUT SCIATICA, UNSPECIFIED BACK PAIN LATERALITY: ICD-10-CM

## 2020-02-12 DIAGNOSIS — M54.50 CHRONIC LOW BACK PAIN WITHOUT SCIATICA, UNSPECIFIED BACK PAIN LATERALITY: ICD-10-CM

## 2020-02-12 DIAGNOSIS — F51.01 PRIMARY INSOMNIA: Chronic | ICD-10-CM

## 2020-02-12 PROCEDURE — 99214 OFFICE O/P EST MOD 30 MIN: CPT | Performed by: INTERNAL MEDICINE

## 2020-02-12 NOTE — PROGRESS NOTES
"[facility] [place] [facility]  BERT PENNINGTON M.D.      Alvin Cruz / 64 y.o. / male  02/12/2020      CHIEF COMPLAINT     Hypertension      ASSESSMENT & PLAN     Problem List Items Addressed This Visit        High    Hypertension - Primary (Chronic)    Current Assessment & Plan     Continue amlodipine 5 mg qd.          Relevant Medications    amLODIPine (NORVASC) 5 MG tablet       Medium    Insomnia (Chronic)    Current Assessment & Plan     Discussed sleep hygiene. Recommended sleep meditation.         Chronic low back pain without sciatica    Current Assessment & Plan     Recommended back stretching exercises in the morning. Educational handout given provided.              No orders of the defined types were placed in this encounter.    No orders of the defined types were placed in this encounter.      Summary/Discussion:  ·       Next Appointment with me: Visit date not found    Return in about 6 months (around 8/12/2020) for Hypertension.      MEDICATIONS     Current Outpatient Medications   Medication Sig Dispense Refill   • amLODIPine (NORVASC) 5 MG tablet TAKE 1 TABLET BY MOUTH DAILY 90 tablet 3   • Calcium Carbonate-Vitamin D (CALCIUM 600+D3 PO) Take  by mouth.     • cetirizine (ZyrTEC) 10 MG tablet Take 0.5 tablets by mouth daily.     • Cholecalciferol 2000 UNITS capsule Take  by mouth Daily.     • mercaptopurine (PURINETHOL) 50 MG chemo tablet Take 50 mg by mouth Daily.     • Probiotic Product (SUPER PROBIOTIC) capsule Take  by mouth.       No current facility-administered medications for this visit.           VITAL SIGNS     Visit Vitals  /80   Pulse 68   Temp 97.6 °F (36.4 °C)   Ht 182.9 cm (72.01\")   Wt 77.6 kg (171 lb)   SpO2 99%   BMI 23.19 kg/m²       BP Readings from Last 3 Encounters:   02/12/20 134/80   08/14/19 132/86   02/11/19 120/74     Wt Readings from Last 3 Encounters:   02/12/20 77.6 kg (171 lb)   08/14/19 75.8 kg (167 lb 3.2 oz)   02/11/19 77.1 kg (170 lb)      Body mass index is 23.19 " kg/m².      HISTORY OF PRESENT ILLNESS     Chronic essential hypertension:  Since prior visit: compliant with medication(s), checks blood pressure occasionally, denies significant problems with medication(s) and SBP < 130.  Most recent in-office blood pressure readings:   BP Readings from Last 3 Encounters:   02/12/20 134/80   08/14/19 132/86   02/11/19 120/74      Chronic low back pain and stiffness. Has ulcerative colitis which has been stable on medications. Sits most of the day for work. Has an adjustable desk that he stands as well. Does not use a foot pad.     Chronic insomnia: has not tried any sleep meditation software. Does not wish to take medication.     Patient Care Team:  Jcarlos Tucker MD as PCP - General  Donnell Lund MD as Consulting Physician (Gastroenterology)      REVIEW OF SYSTEMS     Review of Systems  Constitutional neg  Resp neg  CV neg  GI stable colitis  Psych insomnia     PHYSICAL EXAMINATION     Physical Exam  Constitutional  No distress  Cardiovascular Rate  normal . Rhythm: regular . Heart sounds:  Normal  Pulmonary/Chest: Effort normal and breath sounds normal.    Psychiatric  Alert. Judgment intact. Thought content normal. Mood normal    REVIEWED DATA     Labs:     Lab Results   Component Value Date     02/04/2019    K 4.6 02/04/2019    CALCIUM 10.1 02/04/2019    AST 17 02/04/2019    ALT 18 02/04/2019    BUN 15 02/04/2019    CREATININE 1.29 (H) 02/04/2019    CREATININE 1.23 08/08/2018    CREATININE 1.30 (H) 05/11/2017    EGFRIFNONA 57 (L) 02/04/2019    EGFRIFAFRI 69 02/04/2019       Lab Results   Component Value Date    HGBA1C 5.20 02/04/2019    HGBA1C 5.2 02/01/2016    GLU 87 02/04/2019    GLU 87 08/08/2018    GLU 89 05/11/2017       Lab Results   Component Value Date    LDL 87 02/04/2019    LDL 80 02/01/2016    HDL 52 02/04/2019    HDL 58 02/01/2016    TRIG 90 02/04/2019    TRIG 62 02/01/2016    CHOLHDLRATIO 3.02 02/04/2019    CHOLHDLRATIO 2.6 02/01/2016       Lab Results    Component Value Date    TSH 3.690 2019    FREET4 1.24 2019       Lab Results   Component Value Date    WBC 4.69 2019    HGB 16.6 2019    HGB 15.8 2016     2019       Lab Results   Component Value Date    PROTEIN Negative 2019    GLUCOSEU Negative 2019    BLOODU Negative 2019    NITRITEU Negative 2019    LEUKOCYTESUR Negative 2019       Imaging:         Medical Tests:         Summary of old records / correspondence / consultant report:         Request outside records:         ALLERGIES  Allergies   Allergen Reactions   • Codeine Other (See Comments)     Abdominal cramping   • Infliximab Hives        PFSH:     The following portions of the patient's history were reviewed and updated as appropriate: Allergies / Current Medications / Past Medical History / Surgical History / Social History / Family History    PROBLEM LIST   Patient Active Problem List   Diagnosis   • Atopic rhinitis   • Hypertension   • Insomnia   • Ulcerative colitis (CMS/HCC)   • Chronic low back pain without sciatica       PAST MEDICAL HISTORY  Past Medical History:   Diagnosis Date   • Allergic rhinitis    • Dyslipidemia    • Hypertension        SURGICAL HISTORY  Past Surgical History:   Procedure Laterality Date   • APPENDECTOMY         SOCIAL HISTORY  Social History     Socioeconomic History   • Marital status: Single     Spouse name: Not on file   • Number of children: 0   • Years of education: Not on file   • Highest education level: Not on file   Occupational History   • Occupation:  (Online)   Tobacco Use   • Smoking status: Former Smoker     Years: 12.00     Last attempt to quit: 1988     Years since quittin.1   • Smokeless tobacco: Never Used   Substance and Sexual Activity   • Alcohol use: Yes     Frequency: 2-3 times a week     Drinks per session: 1 or 2   • Drug use: No   • Sexual activity: Yes   Lifestyle   • Physical activity:     Days per week: 4  days     Minutes per session: 30 min   • Stress: To some extent       FAMILY HISTORY  Family History   Problem Relation Age of Onset   • Heart attack Mother    • Coronary artery disease Mother    • Diabetes Mother    • Hypertension Father    • Parkinsonism Father    • Multiple myeloma Brother    • No Known Problems Sister    • No Known Problems Brother    • Colon cancer Neg Hx    • Prostate cancer Neg Hx          **Dragon Disclaimer:   Much of this encounter note is an electronic transcription/translation of spoken language to printed text. The electronic translation of spoken language may permit erroneous, or at times, nonsensical words or phrases to be inadvertently transcribed. Although I have reviewed the note for such errors, some may still exist.       Template created by Lucie Tucker MD

## 2020-07-27 VITALS — WEIGHT: 160 LBS | HEIGHT: 72 IN

## 2020-07-28 ENCOUNTER — TELEHEALTH PROVIDED OTHER THAN IN PATIENT'S HOME (OUTPATIENT)
Dept: URBAN - METROPOLITAN AREA TELEHEALTH 6 | Facility: TELEHEALTH | Age: 65
End: 2020-07-28

## 2020-07-28 DIAGNOSIS — Z86.010 PERSONAL HISTORY OF COLONIC POLYPS: ICD-10-CM

## 2020-07-28 DIAGNOSIS — K51.00 ULCERATIVE (CHRONIC) PANCOLITIS WITHOUT COMPLICATIONS: ICD-10-CM

## 2020-07-28 DIAGNOSIS — Z92.25 PERSONAL HISTORY OF IMMUNOSUPPRESSION THERAPY: ICD-10-CM

## 2020-07-28 PROCEDURE — 99213 OFFICE O/P EST LOW 20 MIN: CPT | Performed by: NURSE PRACTITIONER

## 2020-07-28 RX ORDER — MERCAPTOPURINE 50 MG/1
TABLET ORAL
Qty: 135 | Refills: 3 | Status: ACTIVE

## 2020-08-12 ENCOUNTER — OFFICE VISIT (OUTPATIENT)
Dept: INTERNAL MEDICINE | Age: 65
End: 2020-08-12

## 2020-08-12 ENCOUNTER — HOSPITAL ENCOUNTER (OUTPATIENT)
Dept: GENERAL RADIOLOGY | Facility: HOSPITAL | Age: 65
Discharge: HOME | End: 2020-08-12
Admitting: INTERNAL MEDICINE

## 2020-08-12 VITALS
TEMPERATURE: 96.8 F | BODY MASS INDEX: 22.35 KG/M2 | WEIGHT: 165 LBS | SYSTOLIC BLOOD PRESSURE: 132 MMHG | OXYGEN SATURATION: 99 % | HEIGHT: 72 IN | DIASTOLIC BLOOD PRESSURE: 78 MMHG | HEART RATE: 75 BPM

## 2020-08-12 DIAGNOSIS — K51.90 ULCERATIVE COLITIS WITHOUT COMPLICATIONS, UNSPECIFIED LOCATION (HCC): Chronic | ICD-10-CM

## 2020-08-12 DIAGNOSIS — I10 ESSENTIAL HYPERTENSION: Chronic | ICD-10-CM

## 2020-08-12 DIAGNOSIS — M54.50 CHRONIC LOW BACK PAIN WITHOUT SCIATICA, UNSPECIFIED BACK PAIN LATERALITY: Primary | Chronic | ICD-10-CM

## 2020-08-12 DIAGNOSIS — G89.29 CHRONIC LOW BACK PAIN WITHOUT SCIATICA, UNSPECIFIED BACK PAIN LATERALITY: Primary | Chronic | ICD-10-CM

## 2020-08-12 PROCEDURE — 72110 X-RAY EXAM L-2 SPINE 4/>VWS: CPT

## 2020-08-12 PROCEDURE — 99214 OFFICE O/P EST MOD 30 MIN: CPT | Performed by: INTERNAL MEDICINE

## 2020-08-12 NOTE — PROGRESS NOTES
"[facility] [place] [facility]  BERT PENNINGTON M.D.      Alvin ROSA Anthony / 64 y.o. / male  08/12/2020      VITAL SIGNS     Visit Vitals  /78   Pulse 75   Temp 96.8 °F (36 °C)   Ht 182.9 cm (72.01\")   Wt 74.8 kg (165 lb)   SpO2 99%   BMI 22.37 kg/m²       BP Readings from Last 3 Encounters:   08/12/20 132/78   02/12/20 134/80   08/14/19 132/86     Wt Readings from Last 3 Encounters:   08/12/20 74.8 kg (165 lb)   02/12/20 77.6 kg (171 lb)   08/14/19 75.8 kg (167 lb 3.2 oz)     Body mass index is 22.37 kg/m².      MEDICATIONS     Current Outpatient Medications   Medication Sig Dispense Refill   • amLODIPine (NORVASC) 5 MG tablet TAKE 1 TABLET BY MOUTH DAILY 90 tablet 3   • Calcium Carbonate-Vitamin D (CALCIUM 600+D3 PO) Take  by mouth.     • cetirizine (ZyrTEC) 10 MG tablet Take 0.5 tablets by mouth daily.     • Cholecalciferol 2000 UNITS capsule Take  by mouth Daily.     • mercaptopurine (PURINETHOL) 50 MG chemo tablet Take 50 mg by mouth Daily.     • Probiotic Product (SUPER PROBIOTIC) capsule Take  by mouth.       No current facility-administered medications for this visit.        CHIEF COMPLAINT     Hypertension and Back Pain      ASSESSMENT & PLAN     Problem List Items Addressed This Visit        High    Chronic low back pain without sciatica - Primary (Chronic)    Current Assessment & Plan     Check xray lumbar spine, start formal PT (self refer), Tylenol PRN          Relevant Orders    XR Spine Lumbar Complete 4+VW       Medium    Hypertension (Chronic)    Current Assessment & Plan     Continue amlodipine 5 mg qd.          Relevant Medications    amLODIPine (NORVASC) 5 MG tablet       Low    Ulcerative colitis (CMS/HCC) (Chronic)    Overview     *Dr. Ashely Long. Continue mercaptopurine.              Orders Placed This Encounter   Procedures   • XR Spine Lumbar Complete 4+VW     No orders of the defined types were placed in this encounter.      Summary/Discussion:  •     Next Appointment with me: Visit date not " found    Return in about 6 months (around 2/12/2021) for Reassess chronic medical problems.    _____________________________________________________________________________________    HISTORY OF PRESENT ILLNESS     Chronic low back pain which is somewhat worse. Reluctant to take medications. No significant sciatica.     Has UC which has been stable on medication.     Hypertension: blood pressure is stable at home. Denies problems with amlodipine.       Patient Care Team:  Jcarlos Tucker MD as PCP - General  Donnell Lund MD as Consulting Physician (Gastroenterology)      REVIEW OF SYSTEMS     Review of Systems  No fever  No chest pain or shortness of breath  GI stable  Chronic low back pain   No sciatica       PHYSICAL EXAMINATION     Physical Exam  Constitutional  No distress  Cardiovascular Rate  normal . Rhythm: regular . Heart sounds:  Normal  MuSk no low back tenderness to palpation   Psychiatric  Alert. Judgment and thought content normal. Mood normal     REVIEWED DATA     Labs:     Lab Results   Component Value Date     02/04/2019    K 4.6 02/04/2019    CALCIUM 10.1 02/04/2019    AST 17 02/04/2019    ALT 18 02/04/2019    BUN 15 02/04/2019    CREATININE 1.29 (H) 02/04/2019    CREATININE 1.23 08/08/2018    CREATININE 1.30 (H) 05/11/2017    EGFRIFNONA 57 (L) 02/04/2019    EGFRIFAFRI 69 02/04/2019       Lab Results   Component Value Date    HGBA1C 5.20 02/04/2019    HGBA1C 5.2 02/01/2016    GLU 87 02/04/2019    GLU 87 08/08/2018    GLU 89 05/11/2017       Lab Results   Component Value Date    LDL 87 02/04/2019    LDL 80 02/01/2016    HDL 52 02/04/2019    HDL 58 02/01/2016    TRIG 90 02/04/2019    TRIG 62 02/01/2016    CHOLHDLRATIO 3.02 02/04/2019    CHOLHDLRATIO 2.6 02/01/2016       Lab Results   Component Value Date    TSH 3.690 02/04/2019    FREET4 1.24 02/04/2019       Lab Results   Component Value Date    WBC 4.69 02/04/2019    HGB 16.6 02/04/2019    HGB 15.8 02/01/2016     02/04/2019       Lab  Results   Component Value Date    PROTEIN Negative 2019    GLUCOSEU Negative 2019    BLOODU Negative 2019    NITRITEU Negative 2019    LEUKOCYTESUR Negative 2019       Imaging:         Medical Tests:         Summary of old records / correspondence / consultant report:         Request outside records:         ALLERGIES  Allergies   Allergen Reactions   • Codeine Other (See Comments)     Abdominal cramping   • Infliximab Hives        PFSH:     The following portions of the patient's history were reviewed and updated as appropriate: Allergies / Current Medications / Past Medical History / Surgical History / Social History / Family History    PROBLEM LIST   Patient Active Problem List   Diagnosis   • Atopic rhinitis   • Hypertension   • Insomnia   • Ulcerative colitis (CMS/HCC)   • Chronic low back pain without sciatica       PAST MEDICAL HISTORY  Past Medical History:   Diagnosis Date   • Allergic rhinitis    • Dyslipidemia    • Hypertension        SURGICAL HISTORY  Past Surgical History:   Procedure Laterality Date   • APPENDECTOMY         SOCIAL HISTORY  Social History     Socioeconomic History   • Marital status: Single     Spouse name: Not on file   • Number of children: 0   • Years of education: Not on file   • Highest education level: Not on file   Occupational History   • Occupation:  (Online)   Tobacco Use   • Smoking status: Former Smoker     Years: 12.00     Last attempt to quit: 1988     Years since quittin.6   • Smokeless tobacco: Never Used   Substance and Sexual Activity   • Alcohol use: Yes     Frequency: 2-3 times a week     Drinks per session: 1 or 2   • Drug use: No   • Sexual activity: Yes   Lifestyle   • Physical activity:     Days per week: 4 days     Minutes per session: 30 min   • Stress: To some extent       FAMILY HISTORY  Family History   Problem Relation Age of Onset   • Heart attack Mother    • Coronary artery disease Mother    • Diabetes Mother     • Hypertension Father    • Parkinsonism Father    • Multiple myeloma Brother    • No Known Problems Sister    • No Known Problems Brother    • Colon cancer Neg Hx    • Prostate cancer Neg Hx          **Sally Disclaimer:   Much of this encounter note is an electronic transcription/translation of spoken language to printed text. The electronic translation of spoken language may permit erroneous, or at times, nonsensical words or phrases to be inadvertently transcribed. Although I have reviewed the note for such errors, some may still exist.     Template created by Lucie Tucker MD     Answers for HPI/ROS submitted by the patient on 8/5/2020   Back pain  What is the primary reason for your visit?: Back Pain  Onset: more than 1 month ago  Frequency: constantly  Progression since onset: unchanged  Pain location: sacro-iliac  Pain quality: aching  Radiates to: does not radiate  Pain - numeric: 4/10  Pain is: the same all the time  Aggravated by: bending, sitting, twisting  bladder incontinence: No  bowel incontinence: No  leg pain: No  paresis: No  paresthesias: No  perianal numbness: No  tingling: No  weight loss: No

## 2020-08-13 NOTE — PROGRESS NOTES
Felipa:    Alvin, here are the result(s) of your test(s):     Xray of lumbar spine shows scoliosis and degenerative changes. The scoliosis may be your primary problem.     Please do not hesitate to contact me if you have questions.

## 2020-10-20 ENCOUNTER — TELEPHONE (OUTPATIENT)
Dept: INTERNAL MEDICINE | Age: 65
End: 2020-10-20

## 2020-10-20 DIAGNOSIS — G89.29 CHRONIC LOW BACK PAIN WITH SCIATICA, SCIATICA LATERALITY UNSPECIFIED, UNSPECIFIED BACK PAIN LATERALITY: Primary | ICD-10-CM

## 2020-10-20 DIAGNOSIS — M54.40 CHRONIC LOW BACK PAIN WITH SCIATICA, SCIATICA LATERALITY UNSPECIFIED, UNSPECIFIED BACK PAIN LATERALITY: Primary | ICD-10-CM

## 2020-10-20 NOTE — TELEPHONE ENCOUNTER
PATIENT STATES: that he would like a referral for [facility]   [address], [place] [id number] [phone]    PATIENT CAN BE REACHED ON:[phone]

## 2020-12-21 DIAGNOSIS — I10 ESSENTIAL HYPERTENSION: Chronic | ICD-10-CM

## 2020-12-22 RX ORDER — AMLODIPINE BESYLATE 5 MG/1
5 TABLET ORAL DAILY
Qty: 90 TABLET | Refills: 3 | Status: SHIPPED | OUTPATIENT
Start: 2020-12-22 | End: 2021-12-17

## 2021-01-25 ENCOUNTER — OFFICE VISIT (OUTPATIENT)
Dept: INTERNAL MEDICINE | Age: 66
End: 2021-01-25

## 2021-01-25 VITALS
TEMPERATURE: 97.5 F | SYSTOLIC BLOOD PRESSURE: 128 MMHG | HEIGHT: 72 IN | WEIGHT: 169 LBS | OXYGEN SATURATION: 99 % | BODY MASS INDEX: 22.89 KG/M2 | HEART RATE: 76 BPM | DIASTOLIC BLOOD PRESSURE: 74 MMHG

## 2021-01-25 DIAGNOSIS — I10 ESSENTIAL HYPERTENSION: Primary | Chronic | ICD-10-CM

## 2021-01-25 DIAGNOSIS — M54.50 CHRONIC LOW BACK PAIN WITHOUT SCIATICA, UNSPECIFIED BACK PAIN LATERALITY: Chronic | ICD-10-CM

## 2021-01-25 DIAGNOSIS — K51.90 ULCERATIVE COLITIS WITHOUT COMPLICATIONS, UNSPECIFIED LOCATION (HCC): Chronic | ICD-10-CM

## 2021-01-25 DIAGNOSIS — L98.9 SKIN LESION OF NECK: ICD-10-CM

## 2021-01-25 DIAGNOSIS — Z51.81 THERAPEUTIC DRUG MONITORING: ICD-10-CM

## 2021-01-25 DIAGNOSIS — G89.29 CHRONIC LOW BACK PAIN WITHOUT SCIATICA, UNSPECIFIED BACK PAIN LATERALITY: Chronic | ICD-10-CM

## 2021-01-25 PROCEDURE — 90686 IIV4 VACC NO PRSV 0.5 ML IM: CPT | Performed by: INTERNAL MEDICINE

## 2021-01-25 PROCEDURE — 99214 OFFICE O/P EST MOD 30 MIN: CPT | Performed by: INTERNAL MEDICINE

## 2021-01-25 PROCEDURE — 90471 IMMUNIZATION ADMIN: CPT | Performed by: INTERNAL MEDICINE

## 2021-01-25 NOTE — ASSESSMENT & PLAN NOTE
Dr. Lund has apparently retired and he is overdue on labs for mercaptopurine monitoring.   Check complete blood count and CMP.   Advised to make an appointment with an associate of Dr. Lund's.

## 2021-01-25 NOTE — PROGRESS NOTES
I N T E R N A L  M E D I C I N E  J U N O H  K I M,  M D      ENCOUNTER DATE:  01/25/2021    Alvin Cruz / 65 y.o. / male      CHIEF COMPLAINT / REASON FOR OFFICE VISIT     Hypertension and Skin lesion on right neck      ASSESSMENT & PLAN     Problem List Items Addressed This Visit     Hypertension - Primary (Chronic)    Current Assessment & Plan     Stable. Continue amlodipine 5 mg qd.          Relevant Medications    amLODIPine (NORVASC) 5 MG tablet    Other Relevant Orders    Lipid Panel With / Chol / HDL Ratio    Comprehensive Metabolic Panel    Ulcerative colitis (CMS/HCC) (Chronic)    Overview     *Dr. Lund         Current Assessment & Plan     Dr. Lund has apparently retired and he is overdue on labs for mercaptopurine monitoring.   Check complete blood count and CMP.   Advised to make an appointment with an associate of Dr. Lund's.          Relevant Orders    Comprehensive Metabolic Panel    CBC & Differential    Skin lesion of neck    Current Assessment & Plan     Suspicious skin lesion for cancer/pre-cancer. Referral to dermatologist.          Relevant Orders    Ambulatory Referral to Dermatology    Chronic low back pain without sciatica (Chronic)    Current Assessment & Plan     Overall stable after PT. Monitor for worsening symptoms.            Other Visit Diagnoses     Therapeutic drug monitoring        Relevant Orders    Comprehensive Metabolic Panel    CBC & Differential        Orders Placed This Encounter   Procedures   • Fluarix Quad >6 Months (VFC) (8545-4857)   • Lipid Panel With / Chol / HDL Ratio   • Comprehensive Metabolic Panel   • Ambulatory Referral to Dermatology   • CBC & Differential     No orders of the defined types were placed in this encounter.      SUMMARY/DISCUSSION  •       Next Appointment with me: Visit date not found    Return in about 6 months (around 7/25/2021) for COMBINED annual physical & chronic medical.      VITAL SIGNS     Visit Vitals  /74   Pulse  "76   Temp 97.5 °F (36.4 °C)   Ht 182.9 cm (72.01\")   Wt 76.7 kg (169 lb)   SpO2 99%   BMI 22.91 kg/m²       BP Readings from Last 3 Encounters:   01/25/21 128/74   08/12/20 132/78   02/12/20 134/80     Wt Readings from Last 3 Encounters:   01/25/21 76.7 kg (169 lb)   08/12/20 74.8 kg (165 lb)   02/12/20 77.6 kg (171 lb)     Body mass index is 22.91 kg/m².        HISTORY OF PRESENT ILLNESS     Hypertension remains stable on amlodipine. 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 No change in UC without diarrhea or bleeding. Chronic low back pain is overall stable after PT. Complains of skin lesion/growth on right side of neck. Not sure how long it has been present.         REVIEW OF SYSTEMS           PHYSICAL EXAMINATION     Physical Exam  Right side neck skin lesion/growth suspicious for cancer  No cervical lymphadenopathy   Cardiovascular: Normal rate, regular rhythm.  Pulm/Chest: Effort normal, breath sounds normal.   Abdomen: Soft and nontender.   No icterus or jaundice       REVIEWED DATA     Labs:     Lab Results   Component Value Date     02/04/2019    K 4.6 02/04/2019    AST 17 02/04/2019    ALT 18 02/04/2019    BUN 15 02/04/2019    CREATININE 1.29 (H) 02/04/2019    CREATININE 1.23 08/08/2018    CREATININE 1.30 (H) 05/11/2017    EGFRIFNONA 57 (L) 02/04/2019    EGFRIFAFRI 69 02/04/2019       Lab Results   Component Value Date    HGBA1C 5.20 02/04/2019    HGBA1C 5.2 02/01/2016       Lab Results   Component Value Date    LDL 87 02/04/2019    LDL 80 02/01/2016    HDL 52 02/04/2019    TRIG 90 02/04/2019       Lab Results   Component Value Date    TSH 3.690 02/04/2019    FREET4 1.24 02/04/2019       Lab Results   Component Value Date    WBC 4.69 02/04/2019    HGB 16.6 02/04/2019     02/04/2019         Imaging:           Medical Tests:           Summary of old records / correspondence / consultant report:           Request outside records:           MEDICATIONS AT " THE TIME OF OFFICE VISIT     Current Outpatient Medications   Medication Sig Dispense Refill   • amLODIPine (NORVASC) 5 MG tablet TAKE 1 TABLET BY MOUTH DAILY 90 tablet 3   • Calcium Carbonate-Vitamin D (CALCIUM 600+D3 PO) Take  by mouth.     • cetirizine (ZyrTEC) 10 MG tablet Take 0.5 tablets by mouth daily.     • Cholecalciferol 2000 UNITS capsule Take  by mouth Daily.     • mercaptopurine (PURINETHOL) 50 MG chemo tablet Take 50 mg by mouth Daily.     • Probiotic Product (SUPER PROBIOTIC) capsule Take  by mouth.       No current facility-administered medications for this visit.            *Examiner was wearing [id number] mask, face shield and exam gloves during the entire duration of the visit. Patient was masked the entire time.   Minimum social distance of 6 ft maintained entire visit except if physical contact was necessary as documented.     **Dragon Disclaimer:   Much of this encounter note is an electronic transcription/translation of spoken language to printed text. The electronic translation of spoken language may permit erroneous, or at times, nonsensical words or phrases to be inadvertently transcribed. Although I have reviewed the note for such errors, some may still exist.     Template created by Lucie Tucker MD

## 2021-05-03 VITALS
SYSTOLIC BLOOD PRESSURE: 130 MMHG | TEMPERATURE: 97.4 F | WEIGHT: 170 LBS | HEIGHT: 72 IN | DIASTOLIC BLOOD PRESSURE: 76 MMHG

## 2021-05-04 ENCOUNTER — TELEPHONE (OUTPATIENT)
Dept: INTERNAL MEDICINE | Age: 66
End: 2021-05-04

## 2021-05-04 ENCOUNTER — OFFICE (OUTPATIENT)
Dept: URBAN - METROPOLITAN AREA CLINIC 75 | Facility: CLINIC | Age: 66
End: 2021-05-04

## 2021-05-04 DIAGNOSIS — K51.00 ULCERATIVE (CHRONIC) PANCOLITIS WITHOUT COMPLICATIONS: ICD-10-CM

## 2021-05-04 DIAGNOSIS — K62.5 HEMORRHAGE OF ANUS AND RECTUM: ICD-10-CM

## 2021-05-04 DIAGNOSIS — Z92.25 PERSONAL HISTORY OF IMMUNOSUPPRESSION THERAPY: ICD-10-CM

## 2021-05-04 PROCEDURE — 99214 OFFICE O/P EST MOD 30 MIN: CPT | Performed by: INTERNAL MEDICINE

## 2021-05-04 NOTE — TELEPHONE ENCOUNTER
Pt declined appt with APRNs at this time. Pt requested to be seen by PCP upon return. Pt was ivory 5/12/2021 @ 0191. Pt verbalized understanding. MM

## 2021-05-04 NOTE — TELEPHONE ENCOUNTER
Caller: Alvin Cruz    Relationship: Self    Best call back number [phone]     What orders are you requesting (i.e. lab or imaging): MRI OF SPINE DUE TO BACK PAIN

## 2021-05-12 ENCOUNTER — OFFICE VISIT (OUTPATIENT)
Dept: INTERNAL MEDICINE | Age: 66
End: 2021-05-12

## 2021-05-12 VITALS
TEMPERATURE: 97.7 F | SYSTOLIC BLOOD PRESSURE: 144 MMHG | OXYGEN SATURATION: 98 % | HEIGHT: 72 IN | BODY MASS INDEX: 22.48 KG/M2 | WEIGHT: 166 LBS | DIASTOLIC BLOOD PRESSURE: 78 MMHG | HEART RATE: 74 BPM

## 2021-05-12 DIAGNOSIS — G89.29 CHRONIC LOW BACK PAIN WITHOUT SCIATICA, UNSPECIFIED BACK PAIN LATERALITY: Primary | Chronic | ICD-10-CM

## 2021-05-12 DIAGNOSIS — M54.50 CHRONIC LOW BACK PAIN WITHOUT SCIATICA, UNSPECIFIED BACK PAIN LATERALITY: Primary | Chronic | ICD-10-CM

## 2021-05-12 PROCEDURE — 99213 OFFICE O/P EST LOW 20 MIN: CPT | Performed by: INTERNAL MEDICINE

## 2021-05-12 NOTE — PROGRESS NOTES
"    I N T E R N A L  M E D I C I N E  J U N O H  K I M,  M D      ENCOUNTER DATE:  05/12/2021    Alvin Cruz / 65 y.o. / male      CHIEF COMPLAINT / REASON FOR OFFICE VISIT     Back Pain (pt need MRI)      ASSESSMENT & PLAN     Problem List Items Addressed This Visit        Medium    Chronic low back pain without sciatica - Primary (Chronic)    Current Assessment & Plan     Recommended restarting formal PT. No indication for MRI at this point. Discussed ergonomics, sleep posture.          Relevant Orders    Ambulatory Referral to Physical Therapy Evaluate and treat (Completed)        Orders Placed This Encounter   Procedures   • Ambulatory Referral to Physical Therapy Evaluate and treat     No orders of the defined types were placed in this encounter.      SUMMARY/DISCUSSION  •       Next Appointment with me: 8/26/2021    No follow-ups on file.      VITAL SIGNS     Visit Vitals  /78 (BP Location: Left arm)   Pulse 74   Temp 97.7 °F (36.5 °C)   Ht 182.9 cm (72.01\")   Wt 75.3 kg (166 lb)   SpO2 98%   BMI 22.51 kg/m²       BP Readings from Last 3 Encounters:   05/12/21 144/78   01/25/21 128/74   08/12/20 132/78     Wt Readings from Last 3 Encounters:   05/12/21 75.3 kg (166 lb)   01/25/21 76.7 kg (169 lb)   08/12/20 74.8 kg (165 lb)     Body mass index is 22.51 kg/m².      MEDICATIONS AT THE TIME OF OFFICE VISIT     Current Outpatient Medications on File Prior to Visit   Medication Sig   • amLODIPine (NORVASC) 5 MG tablet TAKE 1 TABLET BY MOUTH DAILY   • Calcium Carbonate-Vitamin D (CALCIUM 600+D3 PO) Take  by mouth.   • cetirizine (ZyrTEC) 10 MG tablet Take 0.5 tablets by mouth daily.   • Cholecalciferol 2000 UNITS capsule Take  by mouth Daily.   • mercaptopurine (PURINETHOL) 50 MG chemo tablet Take 50 mg by mouth Daily.   • Probiotic Product (SUPER PROBIOTIC) capsule Take  by mouth.     No current facility-administered medications on file prior to visit.          HISTORY OF PRESENT ILLNESS     Chronic low " back pain without sciatica. Doing better now. Would like to go back to [facility].       Patient Care Team:  Jcarlos Tucker MD as PCP - General  Donnell Lund MD as Consulting Physician (Gastroenterology)    REVIEW OF SYSTEMS     No sciatica   No fever or chills    neg       PHYSICAL EXAMINATION     Physical Exam  No acute distress   Lower back tenderness (mild)       REVIEWED DATA     Labs:     Lab Results   Component Value Date     02/04/2019    K 4.6 02/04/2019    CALCIUM 10.1 02/04/2019    AST 17 02/04/2019    ALT 18 02/04/2019    BUN 15 02/04/2019    CREATININE 1.29 (H) 02/04/2019    CREATININE 1.23 08/08/2018    CREATININE 1.30 (H) 05/11/2017    EGFRIFNONA 57 (L) 02/04/2019    EGFRIFAFRI 69 02/04/2019       Lab Results   Component Value Date    HGBA1C 5.20 02/04/2019    HGBA1C 5.2 02/01/2016       Lab Results   Component Value Date    LDL 87 02/04/2019    LDL 80 02/01/2016    HDL 52 02/04/2019    TRIG 90 02/04/2019       Lab Results   Component Value Date    TSH 3.690 02/04/2019    FREET4 1.24 02/04/2019       Lab Results   Component Value Date    WBC 4.69 02/04/2019    HGB 16.6 02/04/2019     02/04/2019         Imaging:     XR SPINE LUMBAR COMPLETE 4+VW-     CLINICAL INFORMATION: Chronic low back pain, ulcerative colitis.     FINDINGS: There is 17? of lumbar scoliosis convexity towards the left.  It has a rotary component. Paravertebral soft tissues have a  satisfactory appearance. There is lower lumbar facet hypertrophy. There  is multilevel lumbar disc space narrowing most pronounced at L2-3 and  L3-4. No compression deformity, spondylolysis nor spondylolisthesis.  There is lower lumbar facet hypertrophy.     CONCLUSION: Lumbar scoliosis and degenerative change as described above.     This report was finalized on 8/12/2020       Medical Tests:           Summary of old records / correspondence / consultant report:           Request outside records:             *Examiner was wearing KN95 mask,  face shield and exam gloves during the entire duration of the visit. Patient was masked the entire time.   Minimum social distance of 6 ft maintained entire visit except if physical contact was necessary as documented.     **Dragon Disclaimer:   Much of this encounter note is an electronic transcription/translation of spoken language to printed text. The electronic translation of spoken language may permit erroneous, or at times, nonsensical words or phrases to be inadvertently transcribed. Although I have reviewed the note for such errors, some may still exist.     Template created by Lucie Tucker MD

## 2021-05-12 NOTE — ASSESSMENT & PLAN NOTE
Recommended restarting formal PT. No indication for MRI at this point. Discussed ergonomics, sleep posture.

## 2021-08-05 DIAGNOSIS — Z12.5 ENCOUNTER FOR SCREENING FOR MALIGNANT NEOPLASM OF PROSTATE: ICD-10-CM

## 2021-08-05 DIAGNOSIS — Z00.00 PREVENTATIVE HEALTH CARE: Primary | ICD-10-CM

## 2021-11-09 ENCOUNTER — OFFICE (OUTPATIENT)
Dept: URBAN - METROPOLITAN AREA CLINIC 75 | Facility: CLINIC | Age: 66
End: 2021-11-09

## 2021-11-09 VITALS
WEIGHT: 164 LBS | HEART RATE: 63 BPM | HEIGHT: 72 IN | OXYGEN SATURATION: 99 % | DIASTOLIC BLOOD PRESSURE: 78 MMHG | SYSTOLIC BLOOD PRESSURE: 120 MMHG

## 2021-11-09 DIAGNOSIS — K51.00 ULCERATIVE (CHRONIC) PANCOLITIS WITHOUT COMPLICATIONS: ICD-10-CM

## 2021-11-09 PROCEDURE — 99214 OFFICE O/P EST MOD 30 MIN: CPT | Performed by: INTERNAL MEDICINE

## 2021-11-09 RX ORDER — MERCAPTOPURINE 50 MG/1
TABLET ORAL
Qty: 135 | Refills: 3 | Status: ACTIVE

## 2021-12-01 VITALS
SYSTOLIC BLOOD PRESSURE: 112 MMHG | HEART RATE: 75 BPM | OXYGEN SATURATION: 99 % | RESPIRATION RATE: 19 BRPM | RESPIRATION RATE: 10 BRPM | DIASTOLIC BLOOD PRESSURE: 93 MMHG | TEMPERATURE: 97 F | SYSTOLIC BLOOD PRESSURE: 104 MMHG | HEART RATE: 83 BPM | DIASTOLIC BLOOD PRESSURE: 73 MMHG | DIASTOLIC BLOOD PRESSURE: 80 MMHG | SYSTOLIC BLOOD PRESSURE: 127 MMHG | DIASTOLIC BLOOD PRESSURE: 76 MMHG | RESPIRATION RATE: 13 BRPM | SYSTOLIC BLOOD PRESSURE: 109 MMHG | HEART RATE: 88 BPM | OXYGEN SATURATION: 96 % | WEIGHT: 164 LBS | RESPIRATION RATE: 18 BRPM | RESPIRATION RATE: 16 BRPM | DIASTOLIC BLOOD PRESSURE: 89 MMHG | SYSTOLIC BLOOD PRESSURE: 138 MMHG | OXYGEN SATURATION: 98 % | SYSTOLIC BLOOD PRESSURE: 111 MMHG | DIASTOLIC BLOOD PRESSURE: 90 MMHG | RESPIRATION RATE: 15 BRPM | DIASTOLIC BLOOD PRESSURE: 69 MMHG | HEART RATE: 79 BPM | OXYGEN SATURATION: 95 % | RESPIRATION RATE: 12 BRPM | DIASTOLIC BLOOD PRESSURE: 70 MMHG | HEART RATE: 78 BPM | TEMPERATURE: 98.1 F | HEIGHT: 72 IN | HEART RATE: 86 BPM | HEART RATE: 85 BPM | SYSTOLIC BLOOD PRESSURE: 118 MMHG | SYSTOLIC BLOOD PRESSURE: 105 MMHG | DIASTOLIC BLOOD PRESSURE: 82 MMHG | SYSTOLIC BLOOD PRESSURE: 132 MMHG | SYSTOLIC BLOOD PRESSURE: 150 MMHG | HEART RATE: 82 BPM | RESPIRATION RATE: 14 BRPM | SYSTOLIC BLOOD PRESSURE: 108 MMHG | HEART RATE: 80 BPM | HEART RATE: 87 BPM | RESPIRATION RATE: 17 BRPM | OXYGEN SATURATION: 100 % | OXYGEN SATURATION: 97 %

## 2021-12-02 ENCOUNTER — OFFICE (OUTPATIENT)
Dept: URBAN - METROPOLITAN AREA PATHOLOGY 4 | Facility: PATHOLOGY | Age: 66
End: 2021-12-02

## 2021-12-02 ENCOUNTER — AMBULATORY SURGICAL CENTER (OUTPATIENT)
Dept: URBAN - METROPOLITAN AREA SURGERY 17 | Facility: SURGERY | Age: 66
End: 2021-12-02

## 2021-12-02 DIAGNOSIS — K52.89 OTHER SPECIFIED NONINFECTIVE GASTROENTERITIS AND COLITIS: ICD-10-CM

## 2021-12-02 DIAGNOSIS — Z86.010 PERSONAL HISTORY OF COLONIC POLYPS: ICD-10-CM

## 2021-12-02 DIAGNOSIS — K57.30 DIVERTICULOSIS OF LARGE INTESTINE WITHOUT PERFORATION OR ABS: ICD-10-CM

## 2021-12-02 DIAGNOSIS — K64.8 OTHER HEMORRHOIDS: ICD-10-CM

## 2021-12-02 DIAGNOSIS — K62.1 RECTAL POLYP: ICD-10-CM

## 2021-12-02 DIAGNOSIS — K51.00 ULCERATIVE (CHRONIC) PANCOLITIS WITHOUT COMPLICATIONS: ICD-10-CM

## 2021-12-02 DIAGNOSIS — K51.40 INFLAMMATORY POLYPS OF COLON WITHOUT COMPLICATIONS: ICD-10-CM

## 2021-12-02 DIAGNOSIS — K63.5 POLYP OF COLON: ICD-10-CM

## 2021-12-02 LAB
GI HISTOLOGY: A. UNSPECIFIED: (no result)
GI HISTOLOGY: B. UNSPECIFIED: (no result)
GI HISTOLOGY: C. SELECT: (no result)
GI HISTOLOGY: D. SELECT: (no result)
GI HISTOLOGY: E. UNSPECIFIED: (no result)
GI HISTOLOGY: F. SELECT: (no result)
GI HISTOLOGY: G. UNSPECIFIED: (no result)
GI HISTOLOGY: H. SELECT: (no result)
GI HISTOLOGY: I. SELECT: (no result)
GI HISTOLOGY: J. SELECT: (no result)
GI HISTOLOGY: K. SELECT: (no result)
GI HISTOLOGY: L. SELECT: (no result)
GI HISTOLOGY: M. UNSPECIFIED: (no result)
GI HISTOLOGY: PDF REPORT: (no result)

## 2021-12-02 PROCEDURE — 45385 COLONOSCOPY W/LESION REMOVAL: CPT | Mod: 33 | Performed by: INTERNAL MEDICINE

## 2021-12-02 PROCEDURE — 45380 COLONOSCOPY AND BIOPSY: CPT | Mod: 33,59 | Performed by: INTERNAL MEDICINE

## 2021-12-02 PROCEDURE — 88305 TISSUE EXAM BY PATHOLOGIST: CPT | Performed by: INTERNAL MEDICINE

## 2021-12-02 NOTE — SERVICEHPINOTES
AC MARTINEZ  is a  65  male   who presents today for a  Colonoscopy   for   the indications listed below. The updated Patient Profile was reviewed prior to the procedure, in conjunction with the Physical Exam, including medical conditions, surgical procedures, medications, allergies, family history and social history. See Physical Exam time stamp below for date and time of HPI completion.Pre-operatively, I reviewed the indication(s) for the procedure, the risks of the procedure [including but not limited to: unexpected bleeding possibly requiring hospitalization and/or unplanned repeat procedures, perforation possibly requiring surgical treatment, missed lesions and complications of sedation/MAC (also explained by anesthesia staff)]. I have evaluated the patient for risks associated with the planned anesthesia and the procedure to be performed and find the patient an acceptable candidate for IV sedation.Multiple opportunities were provided for any questions or concerns, and all questions were answered satisfactorily before any anesthesia was administered. We will proceed with the planned procedure.br

## 2021-12-16 DIAGNOSIS — I10 ESSENTIAL HYPERTENSION: Chronic | ICD-10-CM

## 2021-12-17 RX ORDER — AMLODIPINE BESYLATE 5 MG/1
5 TABLET ORAL DAILY
Qty: 90 TABLET | Refills: 0 | Status: SHIPPED | OUTPATIENT
Start: 2021-12-17 | End: 2022-03-22 | Stop reason: SDUPTHER

## 2022-02-02 ENCOUNTER — OFFICE VISIT (OUTPATIENT)
Dept: INTERNAL MEDICINE | Age: 67
End: 2022-02-02

## 2022-02-02 VITALS
DIASTOLIC BLOOD PRESSURE: 80 MMHG | HEIGHT: 72 IN | TEMPERATURE: 98.2 F | HEART RATE: 76 BPM | BODY MASS INDEX: 23.03 KG/M2 | SYSTOLIC BLOOD PRESSURE: 132 MMHG | OXYGEN SATURATION: 98 % | WEIGHT: 170 LBS

## 2022-02-02 DIAGNOSIS — Z00.00 ENCOUNTER FOR ANNUAL HEALTH EXAMINATION: Primary | ICD-10-CM

## 2022-02-02 PROCEDURE — 90471 IMMUNIZATION ADMIN: CPT | Performed by: INTERNAL MEDICINE

## 2022-02-02 PROCEDURE — 99396 PREV VISIT EST AGE 40-64: CPT | Performed by: INTERNAL MEDICINE

## 2022-02-02 PROCEDURE — 90686 IIV4 VACC NO PRSV 0.5 ML IM: CPT | Performed by: INTERNAL MEDICINE

## 2022-03-01 ENCOUNTER — TELEPHONE (OUTPATIENT)
Dept: INTERNAL MEDICINE | Age: 67
End: 2022-03-01

## 2022-03-01 NOTE — TELEPHONE ENCOUNTER
Caller: Alvin Cruz    Relationship: Self    Best call back number:     What is the best time to reach you:     Who are you requesting to speak with (clinical staff, provider,  specific staff member):     Do you know the name of the person who called:     What was the call regarding: PATIENT IS CALLING IN STATING THAT HE HAD A LAB ON 02/26/22 AND HE HAS RECEIVED A BILL AND HE IS VERY UPSET WITH THE CHARGES.  HE HAS SPOKEN TO BILLING AND WAS TOLD TO CONTACT HIS PCP TO DISCUSS HOW THINGS WERE CODED.  HE WANTS TO BE CALLED BACK TO DISCUSS THIS.      Do you require a callback: YES

## 2022-03-03 NOTE — TELEPHONE ENCOUNTER
I spoke with pt and informed everything was coded right for the physical alicia and labs. Need to find from his insurance if they cover for physical or not     Said they only covered $60. And he has to pay $200.  Pt also informed he may not met the deductible for this year .    Said will check with his insurance.

## 2022-03-22 ENCOUNTER — TELEPHONE (OUTPATIENT)
Dept: INTERNAL MEDICINE | Age: 67
End: 2022-03-22

## 2022-03-22 DIAGNOSIS — I10 ESSENTIAL HYPERTENSION: Chronic | ICD-10-CM

## 2022-03-22 RX ORDER — AMLODIPINE BESYLATE 5 MG/1
5 TABLET ORAL DAILY
Qty: 90 TABLET | Refills: 1 | Status: SHIPPED | OUTPATIENT
Start: 2022-03-22 | End: 2022-09-13 | Stop reason: SDUPTHER

## 2022-03-22 NOTE — TELEPHONE ENCOUNTER
Caller: Alvin Cruz    Relationship: Self    Best call back number: [phone]    Requested Prescriptions:   Requested Prescriptions     Pending Prescriptions Disp Refills   • amLODIPine (NORVASC) 5 MG tablet 90 tablet 0     Sig: Take 1 tablet by mouth Daily.        Pharmacy where request should be sent: [id number] - [place], [place] - [address] [place] [address] [phone] [facility] [phone]      Additional details provided by patient: PATIENT HAS 4 TABLETS LEFT    Does the patient have less than a 3 day supply:  [] Yes  [x] No    Kya Morales Rep   03/22/22 08:42 EDT

## 2022-05-09 ENCOUNTER — OFFICE (OUTPATIENT)
Dept: URBAN - METROPOLITAN AREA CLINIC 75 | Facility: CLINIC | Age: 67
End: 2022-05-09

## 2022-05-09 VITALS
HEART RATE: 73 BPM | OXYGEN SATURATION: 99 % | DIASTOLIC BLOOD PRESSURE: 82 MMHG | SYSTOLIC BLOOD PRESSURE: 142 MMHG | HEIGHT: 72 IN | WEIGHT: 167 LBS

## 2022-05-09 DIAGNOSIS — Z92.25 PERSONAL HISTORY OF IMMUNOSUPPRESSION THERAPY: ICD-10-CM

## 2022-05-09 DIAGNOSIS — Z86.010 PERSONAL HISTORY OF COLONIC POLYPS: ICD-10-CM

## 2022-05-09 DIAGNOSIS — K52.89 OTHER SPECIFIED NONINFECTIVE GASTROENTERITIS AND COLITIS: ICD-10-CM

## 2022-05-09 DIAGNOSIS — K51.00 ULCERATIVE (CHRONIC) PANCOLITIS WITHOUT COMPLICATIONS: ICD-10-CM

## 2022-05-09 PROCEDURE — 99214 OFFICE O/P EST MOD 30 MIN: CPT | Performed by: NURSE PRACTITIONER

## 2022-08-17 DIAGNOSIS — I10 ESSENTIAL HYPERTENSION: Chronic | ICD-10-CM

## 2022-08-19 RX ORDER — AMLODIPINE BESYLATE 5 MG/1
TABLET ORAL
Qty: 90 TABLET | Refills: 1 | OUTPATIENT
Start: 2022-08-19

## 2022-09-13 ENCOUNTER — OFFICE VISIT (OUTPATIENT)
Dept: INTERNAL MEDICINE | Age: 67
End: 2022-09-13

## 2022-09-13 VITALS
SYSTOLIC BLOOD PRESSURE: 130 MMHG | OXYGEN SATURATION: 99 % | HEIGHT: 72 IN | WEIGHT: 167 LBS | DIASTOLIC BLOOD PRESSURE: 84 MMHG | BODY MASS INDEX: 22.62 KG/M2 | TEMPERATURE: 97.6 F | HEART RATE: 76 BPM

## 2022-09-13 DIAGNOSIS — M54.50 CHRONIC LOW BACK PAIN WITHOUT SCIATICA, UNSPECIFIED BACK PAIN LATERALITY: Chronic | ICD-10-CM

## 2022-09-13 DIAGNOSIS — F51.01 PRIMARY INSOMNIA: Chronic | ICD-10-CM

## 2022-09-13 DIAGNOSIS — G89.29 CHRONIC LOW BACK PAIN WITHOUT SCIATICA, UNSPECIFIED BACK PAIN LATERALITY: Chronic | ICD-10-CM

## 2022-09-13 DIAGNOSIS — I10 ESSENTIAL HYPERTENSION: Primary | Chronic | ICD-10-CM

## 2022-09-13 PROCEDURE — 99214 OFFICE O/P EST MOD 30 MIN: CPT | Performed by: NURSE PRACTITIONER

## 2022-09-13 RX ORDER — AMLODIPINE BESYLATE 5 MG/1
5 TABLET ORAL DAILY
Qty: 90 TABLET | Refills: 1 | Status: SHIPPED | OUTPATIENT
Start: 2022-09-13 | End: 2023-03-20

## 2022-09-13 NOTE — PROGRESS NOTES
"    I N T E R N A L  M E D I C I N E  Cherelle Kowalski, APRN    ENCOUNTER DATE:  09/13/2022    Alvin Barreraring / 66 y.o. / male      CHIEF COMPLAINT / REASON FOR OFFICE VISIT     Follow up Hypertension      ASSESSMENT & PLAN     Diagnoses and all orders for this visit:    1. Essential hypertension (Primary)  -     amLODIPine (NORVASC) 5 MG tablet; Take 1 tablet by mouth Daily.  Dispense: 90 tablet; Refill: 1    2. Chronic low back pain without sciatica, unspecified back pain laterality       Takes Tylenol as needed for pain    3. Primary insomnia       States improved with bedtime routine     SUMMARY/DISCUSSION  • Discussion of Hypertension and controlled with current medication regime. No changes at this time  • Follow up with Dr Tucker as scheduled for yearly physical examination      Return in about 6 months (around 3/13/2023) for Annual physical.      VITAL SIGNS     Visit Vitals  /84   Pulse 76   Temp 97.6 °F (36.4 °C) (Temporal)   Ht 182.9 cm (72.01\")   Wt 75.8 kg (167 lb)   SpO2 99%   BMI 22.64 kg/m²           BP Readings from Last 3 Encounters:   09/13/22 130/84   02/02/22 132/80   05/12/21 144/78     Wt Readings from Last 3 Encounters:   09/13/22 75.8 kg (167 lb)   02/02/22 77.1 kg (170 lb)   05/12/21 75.3 kg (166 lb)     Body mass index is 22.64 kg/m².    Blood pressure readings recorded on patient flowsheet:  No flowsheet data found.          MEDICATIONS AT THE TIME OF OFFICE VISIT     Current Outpatient Medications on File Prior to Visit   Medication Sig Dispense Refill   • Calcium Carbonate-Vitamin D (CALCIUM 600+D3 PO) Take  by mouth.     • cetirizine (ZyrTEC) 10 MG tablet Take 10 mg by mouth Daily.     • Cholecalciferol 2000 UNITS capsule Take  by mouth Daily.     • mercaptopurine (PURINETHOL) 50 MG chemo tablet Take 50 mg by mouth Daily.     • Probiotic Product (SUPER PROBIOTIC) capsule Take  by mouth.     • [DISCONTINUED] amLODIPine (NORVASC) 5 MG tablet Take 1 tablet by mouth Daily. 90 tablet 1 "     No current facility-administered medications on file prior to visit.        HISTORY OF PRESENT ILLNESS     66 year old male being seen today for follow up on his hypertension. Blood pressure currently controlled with his amlodipine. Medications reviewed and refills to pharmacy. States chronic back pain stable and really does not take anything for pain except tylenol as needed. States still not sleeping that great but states nightly cocktail has helped. No additional concerns voiced.     Patient Care Team:  Jcarlos Tucker MD as PCP - Varsha Penn MD (Gastroenterology)    REVIEW OF SYSTEMS     Review of Systems   Constitutional: Negative.    HENT: Negative.    Eyes: Negative.         Wears glasses   Respiratory: Negative.    Cardiovascular: Negative.    Gastrointestinal: Negative.    Genitourinary: Negative.    Musculoskeletal: Positive for arthralgias and back pain.   Skin: Negative.    Allergic/Immunologic: Positive for environmental allergies.   Neurological: Negative.    Psychiatric/Behavioral: Negative.           PHYSICAL EXAMINATION     Physical Exam  Constitutional:       Appearance: Normal appearance. He is normal weight.   Cardiovascular:      Rate and Rhythm: Normal rate and regular rhythm.      Pulses: Normal pulses.      Heart sounds: Normal heart sounds.   Pulmonary:      Effort: Pulmonary effort is normal.      Breath sounds: Normal breath sounds.   Musculoskeletal:         General: Normal range of motion.   Skin:     General: Skin is warm and dry.   Neurological:      Mental Status: He is alert and oriented to person, place, and time.             REVIEWED DATA     Labs:     Lab Results   Component Value Date     01/26/2022    K 4.2 01/26/2022    CALCIUM 9.9 01/26/2022    AST 17 01/26/2022    ALT 19 01/26/2022    BUN 21 01/26/2022    CREATININE 1.39 (H) 01/26/2022    CREATININE 1.29 (H) 02/04/2019    CREATININE 1.23 08/08/2018    EGFRIFNONA 53 (L) 01/26/2022    EGFRIFAFRI 61  01/26/2022       Lab Results   Component Value Date    HGBA1C 5.4 01/26/2022    HGBA1C 5.20 02/04/2019    HGBA1C 5.2 02/01/2016       Lab Results   Component Value Date     (H) 01/26/2022    LDL 87 02/04/2019    LDL 80 02/01/2016    HDL 54 01/26/2022    HDL 52 02/04/2019    TRIG 109 01/26/2022    TRIG 90 02/04/2019       Lab Results   Component Value Date    TSH 3.500 01/26/2022    TSH 3.690 02/04/2019    TSH 2.43 02/01/2016    FREET4 1.32 01/26/2022    FREET4 1.24 02/04/2019    FREET4 1.17 02/01/2016       Lab Results   Component Value Date    WBC 5.0 01/26/2022    HGB 15.7 01/26/2022     01/26/2022       No results found for: MALBCRERATIO       Imaging:           Medical Tests:           Summary of old records / correspondence / consultant report:           Request outside records:           JAJA Stone

## 2022-11-14 ENCOUNTER — OFFICE (OUTPATIENT)
Dept: URBAN - METROPOLITAN AREA CLINIC 76 | Facility: CLINIC | Age: 67
End: 2022-11-14

## 2022-11-14 VITALS
DIASTOLIC BLOOD PRESSURE: 96 MMHG | WEIGHT: 168 LBS | HEART RATE: 70 BPM | SYSTOLIC BLOOD PRESSURE: 149 MMHG | HEIGHT: 72 IN

## 2022-11-14 DIAGNOSIS — Z86.010 PERSONAL HISTORY OF COLONIC POLYPS: ICD-10-CM

## 2022-11-14 DIAGNOSIS — K57.30 DIVERTICULOSIS OF LARGE INTESTINE WITHOUT PERFORATION OR ABS: ICD-10-CM

## 2022-11-14 DIAGNOSIS — K51.00 ULCERATIVE (CHRONIC) PANCOLITIS WITHOUT COMPLICATIONS: ICD-10-CM

## 2022-11-14 DIAGNOSIS — Z92.25 PERSONAL HISTORY OF IMMUNOSUPPRESSION THERAPY: ICD-10-CM

## 2022-11-14 PROCEDURE — 99214 OFFICE O/P EST MOD 30 MIN: CPT | Performed by: NURSE PRACTITIONER

## 2022-11-14 RX ORDER — MERCAPTOPURINE 50 MG/1
TABLET ORAL
Qty: 135 | Refills: 3 | Status: ACTIVE

## 2023-02-11 NOTE — PROGRESS NOTES
"    I N T E R N A L  M E D I C I N E  J U N O H  K I M,  M D      ENCOUNTER DATE:  02/02/2022    Alvin Barreraring / 66 y.o. / male    CHIEF COMPLAINT     Annual Exam (2/11/19)      VITALS     Visit Vitals  /80 (BP Location: Left arm)   Pulse 76   Temp 98.2 °F (36.8 °C)   Ht 182.9 cm (72.01\")   Wt 77.1 kg (170 lb)   SpO2 98%   BMI 23.05 kg/m²       BP Readings from Last 3 Encounters:   02/02/22 132/80   05/12/21 144/78   01/25/21 128/74     Wt Readings from Last 3 Encounters:   02/02/22 77.1 kg (170 lb)   05/12/21 75.3 kg (166 lb)   01/25/21 76.7 kg (169 lb)      Body mass index is 23.05 kg/m².    MEDICATIONS     Current Outpatient Medications on File Prior to Visit   Medication Sig Dispense Refill   • amLODIPine (NORVASC) 5 MG tablet TAKE 1 TABLET BY MOUTH DAILY 90 tablet 0   • Calcium Carbonate-Vitamin D (CALCIUM 600+D3 PO) Take  by mouth.     • cetirizine (ZyrTEC) 10 MG tablet Take 10 mg by mouth Daily.     • Cholecalciferol 2000 UNITS capsule Take  by mouth Daily.     • mercaptopurine (PURINETHOL) 50 MG chemo tablet Take 50 mg by mouth Daily.     • Probiotic Product (SUPER PROBIOTIC) capsule Take  by mouth.       No current facility-administered medications on file prior to visit.         HISTORY OF PRESENT ILLNESS      Alvin presents for annual health maintenance visit.    · General health: fair  · Lifestyle:  · Attempting to lose weight?: No   · Diet: eats moderately healthy  · Exercise: exercises 2 days/week  · Tobacco: Former smoker   · Alcohol: drinks almost daily and 1-2 drinks/occasion  · Work: Full-time  · Reproductive health:  · Sexually active?: Yes   · Concern for STD?: No   · Sexual problems?: No problems   · Sees Urologist?: No   · Depression Screening:      PHQ-2/PHQ-9 Depression Screening 2/2/2022   Little interest or pleasure in doing things 0   Feeling down, depressed, or hopeless 0   Total Score 0   If you checked off any problems, how difficult have these problems made it for you to do your " work, take care of things at home, or get along with other people? -         PHQ-2: 0 (Not depressed)     PHQ-9: 0 (Negative screening for depression)    Patient Care Team:  Jcarlos Tucker MD as PCP - Varsha Penn MD (Gastroenterology)  ______________________________________________________________________    ALLERGIES  Allergies   Allergen Reactions   • Codeine Other (See Comments)     Abdominal cramping   • Infliximab Hives        PFSH:     The following portions of the patient's history were reviewed and updated as appropriate: Allergies / Current Medications / Past Medical History / Surgical History / Social History / Family History    PROBLEM LIST   Patient Active Problem List   Diagnosis   • Atopic rhinitis   • Hypertension   • Insomnia   • Ulcerative colitis (HCC)   • Chronic low back pain without sciatica       PAST MEDICAL HISTORY  Past Medical History:   Diagnosis Date   • Allergic rhinitis    • Dyslipidemia    • Hypertension        SURGICAL HISTORY  Past Surgical History:   Procedure Laterality Date   • APPENDECTOMY         SOCIAL HISTORY  Social History     Socioeconomic History   • Marital status: Single   • Number of children: 0   Tobacco Use   • Smoking status: Former Smoker     Years: 12.00     Quit date: 1988     Years since quittin.1   • Smokeless tobacco: Never Used   Substance and Sexual Activity   • Alcohol use: Yes     Comment: 7 days (1 drink)   • Drug use: No   • Sexual activity: Yes     Partners: Female       FAMILY HISTORY  Family History   Problem Relation Age of Onset   • Heart attack Mother    • Coronary artery disease Mother    • Diabetes Mother    • Hypertension Father    • Parkinsonism Father    • Multiple myeloma Brother    • No Known Problems Sister    • No Known Problems Brother    • Colon cancer Neg Hx    • Prostate cancer Neg Hx        IMMUNIZATION HISTORY  Immunization History   Administered Date(s) Administered   • Flu Vaccine Quad PF >18YRS 10/01/2019    • Flu Vaccine Quad PF >36MO 01/21/2019   • FluLaval/Fluarix/Fluzone >6 01/21/2019, 01/25/2021   • FluMist 2-49yrs 03/10/2011, 10/16/2015   • Pneumococcal Polysaccharide (PPSV23) 03/10/2011   • Shingrix 12/20/2018, 04/15/2019   • Td 03/10/2011         REVIEW OF SYSTEMS     Review of Systems   Constitutional: Negative.    HENT: Negative.    Eyes: Negative.    Respiratory: Negative.    Cardiovascular: Negative.    Gastrointestinal: Negative.    Endocrine: Negative.    Genitourinary: Negative.    Musculoskeletal: Positive for back pain.   Skin: Negative.    Allergic/Immunologic: Negative.    Neurological: Negative.    Hematological: Negative.    Psychiatric/Behavioral: Negative.          PHYSICAL EXAMINATION     Physical Exam  HENT:      Head: Normocephalic.      Right Ear: Tympanic membrane, ear canal and external ear normal.      Left Ear: Tympanic membrane, ear canal and external ear normal.   Eyes:      General: No scleral icterus.     Conjunctiva/sclera: Conjunctivae normal.      Pupils: Pupils are equal, round, and reactive to light.   Neck:      Thyroid: No thyromegaly.      Vascular: No carotid bruit.      Trachea: No tracheal deviation.   Cardiovascular:      Rate and Rhythm: Normal rate and regular rhythm.      Pulses: Normal pulses.      Heart sounds: Normal heart sounds. No murmur heard.  No friction rub. No gallop.       Comments: No carotid bruits  Pulmonary:      Effort: Pulmonary effort is normal.      Breath sounds: Normal breath sounds.   Chest:   Breasts:      Right: No supraclavicular adenopathy.      Left: No supraclavicular adenopathy.       Abdominal:      General: There is no distension.      Palpations: Abdomen is soft. There is no mass.      Tenderness: There is no abdominal tenderness.      Hernia: No hernia is present.   Musculoskeletal:         General: No deformity. Normal range of motion.      Cervical back: Neck supple.      Right lower leg: No edema.      Left lower leg: No edema.    Lymphadenopathy:      Cervical: No cervical adenopathy.      Upper Body:      Right upper body: No supraclavicular adenopathy.      Left upper body: No supraclavicular adenopathy.   Skin:     Coloration: Skin is not jaundiced or pale.      Findings: No erythema, lesion (Negative for suspicious skin lesions/growths) or rash.      Nails: There is no clubbing.      Comments: No jaundice  No suspicious skin lesions.    Neurological:      Mental Status: He is alert and oriented to person, place, and time.      Cranial Nerves: No cranial nerve deficit.      Motor: No abnormal muscle tone.      Coordination: Coordination normal.      Deep Tendon Reflexes: Reflexes normal.   Psychiatric:         Mood and Affect: Mood normal.         Behavior: Behavior normal.         Thought Content: Thought content normal.         Judgment: Judgment normal.         REVIEWED DATA      Labs:    Lab Results   Component Value Date     01/26/2022    K 4.2 01/26/2022    CALCIUM 9.9 01/26/2022    AST 17 01/26/2022    ALT 19 01/26/2022    BUN 21 01/26/2022    CREATININE 1.39 (H) 01/26/2022    CREATININE 1.29 (H) 02/04/2019    CREATININE 1.23 08/08/2018    EGFRIFNONA 53 (L) 01/26/2022    EGFRIFAFRI 61 01/26/2022       Lab Results   Component Value Date    GLUCOSE 91 01/26/2022    HGBA1C 5.4 01/26/2022    HGBA1C 5.20 02/04/2019    HGBA1C 5.2 02/01/2016    TSH 3.500 01/26/2022    FREET4 1.32 01/26/2022       Lab Results   Component Value Date    PSA 1.5 01/26/2022    PSA 1.400 02/04/2019    PSA 1.22 02/01/2016       Lab Results   Component Value Date    TESTOSTEROTT 350 02/01/2016       Lab Results   Component Value Date     (H) 01/26/2022    HDL 54 01/26/2022    TRIG 109 01/26/2022    CHOLHDLRATIO 3.2 01/26/2022       No components found for: [id number]    Lab Results   Component Value Date    WBC 5.0 01/26/2022    HGB 15.7 01/26/2022    MCV 93 01/26/2022     01/26/2022       Lab Results   Component Value Date    PROTEIN Negative  01/26/2022    GLUCOSEU Negative 01/26/2022    BLOODU Negative 01/26/2022    NITRITEU Negative 01/26/2022    LEUKOCYTESUR Negative 01/26/2022          Lab Results   Component Value Date    HEPCVIRUSABY <0.1 02/04/2019       Imaging:           Medical Tests:           ASSESSMENT & PLAN     ANNUAL WELLNESS EXAM / PHYSICAL     Other medical problems addressed today:  Problem List Items Addressed This Visit     None      Visit Diagnoses     Encounter for annual health examination    -  Primary          Summary/Discussion:     ·     Next Appointment with me: Visit date not found    Return in about 6 months (around 8/2/2022) for Reassess chronic medical problems.      HEALTHCARE MAINTENANCE ISSUES       Cancer Screening:  · Colon: Initial/Next screening at age: CURRENT  · Repeat colon cancer screening: every 3 years  · Prostate: Performed today and recommended annual screening  · Testicular: Recommended monthly self exam  · Skin: Monthly self skin examination, annual exam by health professional  · Lung: Does not meet criteria for lung cancer screening.   · Other:    Screening Labs & Tests:  · Lab results reviewed & discussed with with patient or orders placed today.  · EKG:  · CV Screening: Lipid panel  · DEXA (75+ or risk factors):   · HEP C (If born [id number] or risk factors): Previously had negative screen  · Other:     Immunization/Vaccinations (to be given today unless deferred by patient)  · Influenza: Give flu shot today  · Hepatitis A: Recommended here or at pharmacy  · Hepatitis B: Verify immunization records  · Tetanus/Pertussis: Recommended here or at pharmacy  · Pneumovax: Not needed at this time  · Shingles: Up to date  · COVID: OMAR THAKUR COVID: Completed primary vaccine series and booster and will provide shot record  Lifestyle Counseling:  · Lifestyle Modifications: Follow a low fat, low cholesterol diet  · Safety Issues: Always wear seatbelt, Avoid texting while driving   · Use sunscreen, regular skin  examination  · Recommended annual dental/vision examination.  · Emotional/Stress/Sleep: Reviewed and  given when appropriate      Health Maintenance   Topic Date Due   • COVID-19 Vaccine (1) Never done   • TDAP/TD VACCINES (3 - Td or Tdap) 03/11/2021   • INFLUENZA VACCINE  08/01/2021   • PROSTATE CANCER SCREENING  01/26/2023   • ANNUAL PHYSICAL  02/03/2023   • COLORECTAL CANCER SCREENING  12/02/2024   • ZOSTER VACCINE  Completed   • Pneumococcal Vaccine 0-64  Aged Out   • HEPATITIS C SCREENING  Discontinued           *Examiner was wearing KN95 mask and eye protection during the entire duration of the visit. Patient was masked the entire time. Minimum social distance of 6 ft maintained entire visit except if physical contact was necessary as documented.       Template created by Lucie Tucker MD    HTN (hypertension)

## 2023-03-14 ENCOUNTER — OFFICE VISIT (OUTPATIENT)
Dept: INTERNAL MEDICINE | Age: 68
End: 2023-03-14
Payer: COMMERCIAL

## 2023-03-14 VITALS
HEIGHT: 72 IN | DIASTOLIC BLOOD PRESSURE: 82 MMHG | WEIGHT: 170 LBS | BODY MASS INDEX: 23.03 KG/M2 | TEMPERATURE: 97.3 F | HEART RATE: 77 BPM | SYSTOLIC BLOOD PRESSURE: 136 MMHG | OXYGEN SATURATION: 99 %

## 2023-03-14 DIAGNOSIS — I10 ESSENTIAL HYPERTENSION: Primary | Chronic | ICD-10-CM

## 2023-03-14 DIAGNOSIS — Z12.5 ENCOUNTER FOR SCREENING FOR MALIGNANT NEOPLASM OF PROSTATE: ICD-10-CM

## 2023-03-14 DIAGNOSIS — N18.31 STAGE 3A CHRONIC KIDNEY DISEASE: Chronic | ICD-10-CM

## 2023-03-14 PROBLEM — N18.9 CKD (CHRONIC KIDNEY DISEASE): Chronic | Status: ACTIVE | Noted: 2023-03-14

## 2023-03-14 PROCEDURE — 99214 OFFICE O/P EST MOD 30 MIN: CPT | Performed by: INTERNAL MEDICINE

## 2023-03-14 NOTE — PROGRESS NOTES
"    I N T E R N A L  M E D I C I N E    J U N O H  K I M  M D      ENCOUNTER DATE:  03/14/2023    Alvin Cruz / 67 y.o. / male    CHIEF COMPLAINT / REASON FOR OFFICE VISIT     Hypertension      ASSESSMENT & PLAN     Problem List Items Addressed This Visit        High    Essential hypertension - Primary (Chronic)    Overview     Continue amlodipine 5 mg qd          Current Assessment & Plan     Blood pressure is marginal. Send blood pressure in 2 weeks. Goal blood pressure < 130/80. Consider adding low dose ACEI. He has stage 3a CKD.          Relevant Medications    amLODIPine (NORVASC) 5 MG tablet    Other Relevant Orders    Basic Metabolic Panel       Medium    CKD (chronic kidney disease) (Chronic)    Current Assessment & Plan     Check BMP today. Avoid OTC NSAIDS. Maintain good hydration.  Optimize blood pressure control. Send blood pressure readings in 2 weeks. Consider low dose ACEI. Discussed.     Lab Results   Component Value Date    CREATININE 1.39 (H) 01/26/2022    CREATININE 1.29 (H) 02/04/2019    CREATININE 1.23 08/08/2018    CREATININE 1.30 (H) 05/11/2017    CREATININE 1.20 02/01/2016    BUN 21 01/26/2022    EGFRIFNONA 53 (L) 01/26/2022    EGFRIFAFRI 61 01/26/2022            Other Visit Diagnoses     Encounter for screening for malignant neoplasm of prostate        Relevant Orders    PSA Screen        Orders Placed This Encounter   Procedures   • Basic Metabolic Panel   • PSA Screen     No orders of the defined types were placed in this encounter.      SUMMARY/DISCUSSION  •       Next Appointment with me: Visit date not found    Return in about 6 months (around 9/14/2023) for Hypertension.      VITAL SIGNS     Vitals:    03/14/23 1045   BP: 136/82   Pulse: 77   Temp: 97.3 °F (36.3 °C)   SpO2: 99%   Weight: 77.1 kg (170 lb)   Height: 182.9 cm (72.01\")       BP Readings from Last 3 Encounters:   03/14/23 136/82   09/13/22 130/84   02/02/22 132/80     Wt Readings from Last 3 Encounters:   03/14/23 77.1 " kg (170 lb)   09/13/22 75.8 kg (167 lb)   02/02/22 77.1 kg (170 lb)     Body mass index is 23.05 kg/m².    Blood pressure readings recorded on patient flowsheet:  No flowsheet data found.       MEDICATIONS AT THE TIME OF OFFICE VISIT     Current Outpatient Medications on File Prior to Visit   Medication Sig   • amLODIPine (NORVASC) 5 MG tablet Take 1 tablet by mouth Daily.   • Calcium Carbonate-Vitamin D (CALCIUM 600+D3 PO) Take  by mouth.   • cetirizine (ZyrTEC) 10 MG tablet Take 1 tablet by mouth Daily.   • Cholecalciferol 2000 UNITS capsule Take  by mouth Daily.   • mercaptopurine (PURINETHOL) 50 MG chemo tablet Take 1 tablet by mouth Daily.   • Probiotic Product (SUPER PROBIOTIC) capsule Take  by mouth.     No current facility-administered medications on file prior to visit.          HISTORY OF PRESENT ILLNESS     Does not monitor blood pressure regularly but is compliant with amlodipine 5 mg.   Denies significant side effects from medication. Has stage 3a CKD. Denies use of NSAIDS. Denies difficulty urinating. PSA has been stable.     UC remains stable on mercaptopurine.       Patient Care Team:  Jcarlos Tucker MD as PCP - General  Varsha Dunlap MD (Gastroenterology)    REVIEW OF SYSTEMS     Review of Systems   Constitutional neg except per HPI   Resp neg  CV neg    negative for change   Neuro negative     PHYSICAL EXAMINATION     Physical Exam  General: No acute distress  Psych: Normal thought and judgment   Cardiovascular Rate: normal. Rhythm: regular. Heart sounds: normal   Pulm/Chest: Effort normal, breath sounds normal.   No lower extremity edema       REVIEWED DATA     Labs:     Lab Results   Component Value Date     01/26/2022    K 4.2 01/26/2022    CALCIUM 9.9 01/26/2022    AST 17 01/26/2022    ALT 19 01/26/2022    BUN 21 01/26/2022    CREATININE 1.39 (H) 01/26/2022    CREATININE 1.29 (H) 02/04/2019    CREATININE 1.23 08/08/2018    EGFRIFNONA 53 (L) 01/26/2022    EGFRIFAFRI 61 01/26/2022        Lab Results   Component Value Date    HGBA1C 5.4 01/26/2022    HGBA1C 5.20 02/04/2019    HGBA1C 5.2 02/01/2016       Lab Results   Component Value Date     (H) 01/26/2022    LDL 87 02/04/2019    LDL 80 02/01/2016    HDL 54 01/26/2022    HDL 52 02/04/2019    TRIG 109 01/26/2022    TRIG 90 02/04/2019       Lab Results   Component Value Date    TSH 3.500 01/26/2022    TSH 3.690 02/04/2019    TSH 2.43 02/01/2016    FREET4 1.32 01/26/2022    FREET4 1.24 02/04/2019    FREET4 1.17 02/01/2016       Lab Results   Component Value Date    WBC 5.0 01/26/2022    HGB 15.7 01/26/2022     01/26/2022       Lab Results   Component Value Date    PSA 1.5 01/26/2022    PSA 1.400 02/04/2019    PSA 1.22 02/01/2016      No results found for: MALBCRERATIO       Imaging:           Medical Tests:           Summary of old records / correspondence / consultant report:           Request outside records:             *Examiner was wearing [id number] mask during the entire duration of the visit. Patient was masked the entire time. Minimum social distance of 6 ft maintained entire visit except if physical contact was necessary as documented.       Template created by Lucie Tucker MD

## 2023-03-14 NOTE — ASSESSMENT & PLAN NOTE
Blood pressure is marginal. Send blood pressure in 2 weeks. Goal blood pressure < 130/80. Consider adding low dose ACEI. He has stage 3a CKD.

## 2023-03-14 NOTE — PATIENT INSTRUCTIONS
** IMPORTANT MESSAGE FROM DR. PENNINGTON **    In our office, your satisfaction is VERY important to us.     You may receive a survey from [employer] by mail or E-mail for you to provide feedback about your visit. This information is invaluable for me to know what we can do to improve our services.     I ask that you please take a few minutes to complete the survey and let us know how we are doing in serving your needs. (You may receive the survey more than once for multiple visits)    Thank You !    Dr. Pennington    _________________________________________________________________________________________________________________________      ** ADDITIONAL INSTRUCTION / REMINDERS FROM DR. PENNINGTON **    OBTAIN THESE VACCINES AT THE PHARMACY:    Td and PCV 20

## 2023-03-14 NOTE — ASSESSMENT & PLAN NOTE
Check BMP today. Avoid OTC NSAIDS. Maintain good hydration.  Optimize blood pressure control. Send blood pressure readings in 2 weeks. Consider low dose ACEI. Discussed.     Lab Results   Component Value Date    CREATININE 1.39 (H) 01/26/2022    CREATININE 1.29 (H) 02/04/2019    CREATININE 1.23 08/08/2018    CREATININE 1.30 (H) 05/11/2017    CREATININE 1.20 02/01/2016    BUN 21 01/26/2022    EGFRIFNONA 53 (L) 01/26/2022    EGFRIFAFRI 61 01/26/2022

## 2023-03-15 LAB
BUN SERPL-MCNC: 16 MG/DL (ref 8–23)
BUN/CREAT SERPL: 12.6 (ref 7–25)
CALCIUM SERPL-MCNC: 10.1 MG/DL (ref 8.6–10.5)
CHLORIDE SERPL-SCNC: 101 MMOL/L (ref 98–107)
CO2 SERPL-SCNC: 31.5 MMOL/L (ref 22–29)
CREAT SERPL-MCNC: 1.27 MG/DL (ref 0.76–1.27)
EGFRCR SERPLBLD CKD-EPI 2021: 62.7 ML/MIN/1.73
GLUCOSE SERPL-MCNC: 95 MG/DL (ref 65–99)
POTASSIUM SERPL-SCNC: 3.9 MMOL/L (ref 3.5–5.2)
PSA SERPL-MCNC: 2 NG/ML (ref 0–4)
SODIUM SERPL-SCNC: 141 MMOL/L (ref 136–145)

## 2023-03-19 DIAGNOSIS — I10 ESSENTIAL HYPERTENSION: Chronic | ICD-10-CM

## 2023-03-20 RX ORDER — AMLODIPINE BESYLATE 5 MG/1
TABLET ORAL
Qty: 90 TABLET | Refills: 1 | Status: SHIPPED | OUTPATIENT
Start: 2023-03-20

## 2023-06-01 ENCOUNTER — OFFICE (OUTPATIENT)
Dept: URBAN - METROPOLITAN AREA CLINIC 76 | Facility: CLINIC | Age: 68
End: 2023-06-01

## 2023-06-01 VITALS
DIASTOLIC BLOOD PRESSURE: 80 MMHG | SYSTOLIC BLOOD PRESSURE: 123 MMHG | WEIGHT: 166 LBS | OXYGEN SATURATION: 98 % | HEIGHT: 72 IN | HEART RATE: 68 BPM

## 2023-06-01 DIAGNOSIS — F43.9 REACTION TO SEVERE STRESS, UNSPECIFIED: ICD-10-CM

## 2023-06-01 DIAGNOSIS — K51.00 ULCERATIVE (CHRONIC) PANCOLITIS WITHOUT COMPLICATIONS: ICD-10-CM

## 2023-06-01 DIAGNOSIS — K59.09 OTHER CONSTIPATION: ICD-10-CM

## 2023-06-01 PROCEDURE — 99214 OFFICE O/P EST MOD 30 MIN: CPT | Performed by: INTERNAL MEDICINE

## 2023-09-15 ENCOUNTER — OFFICE VISIT (OUTPATIENT)
Dept: INTERNAL MEDICINE | Age: 68
End: 2023-09-15
Payer: COMMERCIAL

## 2023-09-15 VITALS
WEIGHT: 162 LBS | DIASTOLIC BLOOD PRESSURE: 90 MMHG | TEMPERATURE: 97.1 F | OXYGEN SATURATION: 97 % | HEIGHT: 72 IN | HEART RATE: 61 BPM | BODY MASS INDEX: 21.94 KG/M2 | SYSTOLIC BLOOD PRESSURE: 146 MMHG

## 2023-09-15 DIAGNOSIS — Z00.00 ENCOUNTER FOR ANNUAL HEALTH EXAMINATION: ICD-10-CM

## 2023-09-15 DIAGNOSIS — I10 ESSENTIAL HYPERTENSION: Primary | Chronic | ICD-10-CM

## 2023-09-15 DIAGNOSIS — N18.31 STAGE 3A CHRONIC KIDNEY DISEASE: Chronic | ICD-10-CM

## 2023-09-15 DIAGNOSIS — K51.90 ULCERATIVE COLITIS WITHOUT COMPLICATIONS, UNSPECIFIED LOCATION: Chronic | ICD-10-CM

## 2023-09-15 DIAGNOSIS — Z12.5 ENCOUNTER FOR SCREENING FOR MALIGNANT NEOPLASM OF PROSTATE: ICD-10-CM

## 2023-09-15 NOTE — ASSESSMENT & PLAN NOTE
7/2023 outside labs: creatinine 1.31     Lab Results   Component Value Date    CREATININE 1.27 03/14/2023    CREATININE 1.39 (H) 01/26/2022    CREATININE 1.29 (H) 02/04/2019    CREATININE 1.23 08/08/2018    CREATININE 1.30 (H) 05/11/2017    BUN 16 03/14/2023    EGFRIFNONA 53 (L) 01/26/2022    EGFRIFAFRI 61 01/26/2022

## 2023-09-15 NOTE — LETTER
[facility]  Vaccine Consent Form    Patient Name:  Alvin Cruz  Patient :  1955     Vaccine(s) Ordered    Pneumococcal Conjugate Vaccine 20-Valent All        Screening Checklist  The following questions should be completed prior to vaccination. If you answer “yes” to any question, it does not necessarily mean you should not be vaccinated. It just means we may need to clarify or ask more questions. If a question is unclear, please ask your healthcare provider to explain it.    Yes No   Any fever or moderate to severe illness today (mild illness and/or antibiotic treatment are not contraindications)?     Do you have a history of a serious reaction to any previous vaccinations, such as anaphylaxis, encephalopathy within 7 days, Guillain-Lihue syndrome within 6 weeks, seizure?     Have you received any live vaccine(s) in the past month (MMR, BALTA)?     Do you have an anaphylactic allergy to latex (DTaP, DTaP-IPV, Hep A, Hep B, MenB, RV, Td, Tdap), baker’s yeast (Hep B, HPV), or gelatin (BALTA, MMR)?     Do you have an anaphylactic allergy to neomycin (Rabies, BALTA, MMR, IPV, Hep A), polymyxin B (IPV), or streptomycin (IPV)?      Any cancer, leukemia, AIDS, or other immune system disorder? (BALTA, MMR, RV)     Do you have a parent, brother, or sister with an immune system problem (if immune competence of vaccine recipient clinically verified, can proceed)? (MMR, BALTA)     Any recent steroid treatments for >2 weeks, chemotherapy, or radiation treatment? (BALTA, MMR)     Have you received antibody-containing blood transfusions or IVIG in the past 11 months (recommended interval is dependent on product)? (MMR, BALTA)     Have you taken antiviral drugs (acyclovir, famciclovir, valacyclovir) in the last 24 or 48 hours, respectively (BALTA)?      Are you pregnant or planning to become pregnant within 1 month? (BALTA, MMR, HPV, IPV, MenB; For hep B- refer to Engerix-B)     For infants, have you ever been told your child has  had intussusception or a medical emergency involving obstruction of the intestine (RV)? If not for an infant, can skip this question.         *Ordering Physician/APC should be consulted if “yes” is checked by the patient or guardian above.      I have received, read, and understand the Vaccine Information Statement (VIS) for each vaccine ordered above.  I have considered my health status as well as the health status of my close contacts.  I have taken the opportunity to discuss my vaccine questions with my health care provider.   I have requested that the ordered vaccine(s) be given to me.  I understand the benefits and risks of the vaccines.  I understand that I should remain in the clinic for 15 minutes after receiving the vaccine(s).  _________________________________________________________  Signature of Patient or Parent/Legal Guardian ____________________  Date

## 2023-09-15 NOTE — ASSESSMENT & PLAN NOTE
BP Readings from Last 3 Encounters:   09/15/23 146/90   03/14/23 136/82   09/13/22 130/84      [employer] BP link for 4 weeks.  Continue amlodipine 5 mg qd for now.

## 2023-09-15 NOTE — PROGRESS NOTES
I N T E R N A L  M E D I C I N E    J U N O H  K I M,  M D      ENCOUNTER DATE:  09/15/2023    Alvin Cruz / 67 y.o. / male    CHIEF COMPLAINT / REASON FOR OFFICE VISIT     Hypertension      ASSESSMENT & PLAN     Problem List Items Addressed This Visit          High    Essential hypertension - Primary (Chronic)    Overview     Continue amlodipine 5 mg qd          Current Assessment & Plan     BP Readings from Last 3 Encounters:   09/15/23 146/90   03/14/23 136/82   09/13/22 130/84      [employer] BP link for 4 weeks.  Continue amlodipine 5 mg qd for now.          Relevant Medications    amLODIPine (NORVASC) 5 MG tablet    Other Relevant Orders    SigmaFlowt BP Flowsheet       Medium    Ulcerative colitis (Chronic)    Overview     *Dr. Lund         Current Assessment & Plan     Continue GI follow-up and mercaptopurine.          CKD (chronic kidney disease) (Chronic)    Current Assessment & Plan     7/2023 outside labs: creatinine 1.31     Lab Results   Component Value Date    CREATININE 1.27 03/14/2023    CREATININE 1.39 (H) 01/26/2022    CREATININE 1.29 (H) 02/04/2019    CREATININE 1.23 08/08/2018    CREATININE 1.30 (H) 05/11/2017    BUN 16 03/14/2023    EGFRIFNONA 53 (L) 01/26/2022    EGFRIFAFRI 61 01/26/2022              Other Visit Diagnoses       Encounter for annual health examination        Relevant Orders    Comprehensive Metabolic Panel    Lipid Panel With / Chol / HDL Ratio    CBC & Differential    Hemoglobin A1c    TSH+Free T4    Urinalysis With Microscopic If Indicated (No Culture) - Urine, Clean Catch    PSA Screen    Encounter for screening for malignant neoplasm of prostate        Relevant Orders    PSA Screen          Orders Placed This Encounter   Procedures    SigmaFlowt BP Flowsheet    Pneumococcal Conjugate Vaccine 20-Valent All    Comprehensive Metabolic Panel    Lipid Panel With / Chol / HDL Ratio    Hemoglobin A1c    TSH+Free T4    Urinalysis With Microscopic If Indicated (No Culture) -  "Urine, Clean Catch    PSA Screen    CBC & Differential     No orders of the defined types were placed in this encounter.      SUMMARY/DISCUSSION        Next Appointment with me: Visit date not found    Return in about 6 months (around 3/15/2024) for ANNUAL PHYSICAL.      VITAL SIGNS     Vitals:    09/15/23 1033   BP: 146/90   Pulse: 61   Temp: 97.1 °F (36.2 °C)   SpO2: 97%   Weight: 73.5 kg (162 lb)   Height: 182.9 cm (72.01\")       BP Readings from Last 3 Encounters:   09/15/23 146/90   03/14/23 136/82   09/13/22 130/84     Wt Readings from Last 3 Encounters:   09/15/23 73.5 kg (162 lb)   03/14/23 77.1 kg (170 lb)   09/13/22 75.8 kg (167 lb)     Body mass index is 21.97 kg/m².    Blood pressure readings recorded on patient flowsheet:       No data to display                  MEDICATIONS AT THE TIME OF OFFICE VISIT     Current Outpatient Medications on File Prior to Visit   Medication Sig    amLODIPine (NORVASC) 5 MG tablet TAKE 1 TABLET BY MOUTH EVERY DAY    Calcium Carbonate-Vitamin D (CALCIUM 600+D3 PO) Take  by mouth.    cetirizine (ZyrTEC) 10 MG tablet Take 1 tablet by mouth Daily.    Cholecalciferol 2000 UNITS capsule Take  by mouth Daily.    mercaptopurine (PURINETHOL) 50 MG chemo tablet Take 1 tablet by mouth Daily.    Probiotic Product (SUPER PROBIOTIC) capsule Take  by mouth.     No current facility-administered medications on file prior to visit.          HISTORY OF PRESENT ILLNESS     Blood pressure is elevated here but reportedly better at home. Did not send blood pressure readings last time. On amlodipine 5 mg qd. UC remains stable on mercaptopurine. Complains of intermittent rectal pain with ejaculation otherwise no other significant urinary symptoms including hematuria or dysuria or change in urination. 7/2023 outside GI labs showed creatinine of 1.31.       Patient Care Team:  Jcarlos Tucker MD as PCP - Varsha Penn MD (Gastroenterology)    REVIEW OF SYSTEMS     Review of Systems "   Constitutional neg except per HPI   Resp neg  CV neg   GI negative for change   per HPI   Left 4th/5th digit paresthesia without weakness of LUE   Neck pain     PHYSICAL EXAMINATION     Physical Exam  General: No acute distress  Psych: Normal thought and judgment   Cardiovascular Rate: normal. Rhythm: regular. Heart sounds: normal   Pulm/Chest: Effort normal, breath sounds normal.       REVIEWED DATA     Labs:     Lab Results   Component Value Date     03/14/2023    K 3.9 03/14/2023    CALCIUM 10.1 03/14/2023    AST 17 01/26/2022    ALT 19 01/26/2022    BUN 16 03/14/2023    CREATININE 1.27 03/14/2023    CREATININE 1.39 (H) 01/26/2022    CREATININE 1.29 (H) 02/04/2019    EGFRIFNONA 53 (L) 01/26/2022    EGFRIFAFRI 61 01/26/2022       Lab Results   Component Value Date    HGBA1C 5.4 01/26/2022    HGBA1C 5.20 02/04/2019    HGBA1C 5.2 02/01/2016       Lab Results   Component Value Date     (H) 01/26/2022    LDL 87 02/04/2019    LDL 80 02/01/2016    HDL 54 01/26/2022    HDL 52 02/04/2019    TRIG 109 01/26/2022    TRIG 90 02/04/2019       Lab Results   Component Value Date    TSH 3.500 01/26/2022    TSH 3.690 02/04/2019    TSH 2.43 02/01/2016    FREET4 1.32 01/26/2022    FREET4 1.24 02/04/2019    FREET4 1.17 02/01/2016       Lab Results   Component Value Date    WBC 5.0 01/26/2022    HGB 15.7 01/26/2022     01/26/2022       Lab Results   Component Value Date    PSA 2.000 03/14/2023    PSA 1.5 01/26/2022    PSA 1.400 02/04/2019        No results found for: MALBCRERATIO       Imaging:           Medical Tests:           Summary of old records / correspondence / consultant report:           Request outside records:

## 2023-09-16 DIAGNOSIS — I10 ESSENTIAL HYPERTENSION: Chronic | ICD-10-CM

## 2023-09-18 RX ORDER — AMLODIPINE BESYLATE 5 MG/1
TABLET ORAL
Qty: 90 TABLET | Refills: 1 | Status: SHIPPED | OUTPATIENT
Start: 2023-09-18

## 2023-12-11 ENCOUNTER — OFFICE (OUTPATIENT)
Dept: URBAN - METROPOLITAN AREA CLINIC 76 | Facility: CLINIC | Age: 68
End: 2023-12-11

## 2023-12-11 VITALS
SYSTOLIC BLOOD PRESSURE: 115 MMHG | DIASTOLIC BLOOD PRESSURE: 89 MMHG | WEIGHT: 167 LBS | OXYGEN SATURATION: 99 % | HEART RATE: 70 BPM | HEIGHT: 72 IN

## 2023-12-11 DIAGNOSIS — K51.00 ULCERATIVE (CHRONIC) PANCOLITIS WITHOUT COMPLICATIONS: ICD-10-CM

## 2023-12-11 DIAGNOSIS — Z86.010 PERSONAL HISTORY OF COLONIC POLYPS: ICD-10-CM

## 2023-12-11 DIAGNOSIS — Z92.25 PERSONAL HISTORY OF IMMUNOSUPPRESSION THERAPY: ICD-10-CM

## 2023-12-11 DIAGNOSIS — K52.9 NONINFECTIVE GASTROENTERITIS AND COLITIS, UNSPECIFIED: ICD-10-CM

## 2023-12-11 PROCEDURE — 99214 OFFICE O/P EST MOD 30 MIN: CPT | Performed by: NURSE PRACTITIONER

## 2023-12-12 RX ORDER — MERCAPTOPURINE 50 MG/1
TABLET ORAL
Qty: 135 | Refills: 3 | Status: ACTIVE

## 2024-03-19 DIAGNOSIS — I10 ESSENTIAL HYPERTENSION: Chronic | ICD-10-CM

## 2024-03-19 RX ORDER — AMLODIPINE BESYLATE 5 MG/1
TABLET ORAL
Qty: 90 TABLET | Refills: 1 | Status: SHIPPED | OUTPATIENT
Start: 2024-03-19

## 2024-03-20 ENCOUNTER — OFFICE VISIT (OUTPATIENT)
Dept: INTERNAL MEDICINE | Age: 69
End: 2024-03-20
Payer: COMMERCIAL

## 2024-03-20 VITALS
HEIGHT: 72 IN | HEART RATE: 67 BPM | TEMPERATURE: 97.7 F | BODY MASS INDEX: 22.48 KG/M2 | OXYGEN SATURATION: 98 % | SYSTOLIC BLOOD PRESSURE: 128 MMHG | DIASTOLIC BLOOD PRESSURE: 82 MMHG | WEIGHT: 166 LBS

## 2024-03-20 DIAGNOSIS — R79.89 ELEVATED TSH: ICD-10-CM

## 2024-03-20 DIAGNOSIS — I10 ESSENTIAL HYPERTENSION: Chronic | ICD-10-CM

## 2024-03-20 DIAGNOSIS — Z00.00 ENCOUNTER FOR ANNUAL HEALTH EXAMINATION: Primary | ICD-10-CM

## 2024-03-20 DIAGNOSIS — K51.90 ULCERATIVE COLITIS WITHOUT COMPLICATIONS, UNSPECIFIED LOCATION: Chronic | ICD-10-CM

## 2024-03-20 DIAGNOSIS — N18.31 STAGE 3A CHRONIC KIDNEY DISEASE: Chronic | ICD-10-CM

## 2024-03-20 NOTE — PROGRESS NOTES
"    I N T E R N A L  M E D I C I N E    J U N O H  K I M,  M D      ENCOUNTER DATE:  03/20/2024    Alvin Barreraring / 68 y.o. / male    CHIEF COMPLAINT     Annual Exam (2/2/22)      VITALS     Vitals:    03/20/24 1348   BP: 128/82   Pulse: 67   Temp: 97.7 °F (36.5 °C)   SpO2: 98%   Weight: 75.3 kg (166 lb)   Height: 182.9 cm (72.01\")       BP Readings from Last 3 Encounters:   03/20/24 128/82   09/15/23 146/90   03/14/23 136/82     Wt Readings from Last 3 Encounters:   03/20/24 75.3 kg (166 lb)   09/15/23 73.5 kg (162 lb)   03/14/23 77.1 kg (170 lb)      Body mass index is 22.51 kg/m².    Blood pressure readings recorded on patient flowsheet:       No data to display                MEDICATIONS     Current Outpatient Medications on File Prior to Visit   Medication Sig Dispense Refill    amLODIPine (NORVASC) 5 MG tablet TAKE 1 TABLET BY MOUTH EVERY DAY 90 tablet 1    Calcium Carbonate-Vitamin D (CALCIUM 600+D3 PO) Take  by mouth.      cetirizine (ZyrTEC) 10 MG tablet Take 1 tablet by mouth Daily.      Cholecalciferol 2000 UNITS capsule Take  by mouth Daily.      mercaptopurine (PURINETHOL) 50 MG chemo tablet Take 1 tablet by mouth Daily.      Probiotic Product (SUPER PROBIOTIC) capsule Take  by mouth.      [DISCONTINUED] amLODIPine (NORVASC) 5 MG tablet TAKE 1 TABLET BY MOUTH EVERY DAY 90 tablet 1     No current facility-administered medications on file prior to visit.         HISTORY OF PRESENT ILLNESS      Alvin presents for annual health maintenance visit.    Chronic essential hypertension:  Since prior visit: compliant with medication(s), checks blood pressure occasionally, and SBP < 130.  Most recent in-office blood pressure readings:   BP Readings from Last 3 Encounters:   03/20/24 128/82   09/15/23 146/90   03/14/23 136/82          General health: some medical problems  Lifestyle:  Attempting to lose weight?: No   Diet: eats moderately healthy  Exercise: exercises 3-5 days weekly  Tobacco: Former smoker "   Alcohol: 7 days/week and 1 drink/occasion  Work: Retired  Reproductive health:  Sexually active?: No   Concern for STD?: No   Sexual problems?: No problems   Sees Urologist?: No   Depression Screening:          3/20/2024     1:51 PM   PHQ-2/PHQ-9 Depression Screening   Little Interest or Pleasure in Doing Things 0-->not at all   Feeling Down, Depressed or Hopeless 0-->not at all   PHQ-9: Brief Depression Severity Measure Score 0         PHQ-2: 0 (Not depressed)     PHQ-9: 0 (Negative screening for depression)    Patient Care Team:  Jcarlos Tucker MD as PCP - Varsha Penn MD (Gastroenterology)  ______________________________________________________________________    ALLERGIES  Allergies   Allergen Reactions    Codeine Other (See Comments)     Abdominal cramping    Infliximab Hives        PFSH:     The following portions of the patient's history were reviewed and updated as appropriate: Allergies / Current Medications / Past Medical History / Surgical History / Social History / Family History    PROBLEM LIST   Patient Active Problem List   Diagnosis    Atopic rhinitis    Essential hypertension    Insomnia    Ulcerative colitis    Chronic low back pain without sciatica    CKD (chronic kidney disease)    Elevated TSH       PAST MEDICAL HISTORY  Past Medical History:   Diagnosis Date    Allergic rhinitis     Colon polyp 12/2/2022    Pseudopolyps    Dyslipidemia     HL (hearing loss) Deaf in left ear since 2008    Hypertension     Inflammatory bowel disease Ulcerative colitis since 2008       SURGICAL HISTORY  Past Surgical History:   Procedure Laterality Date    APPENDECTOMY      COLONOSCOPY  Once every three years       SOCIAL HISTORY  Social History     Socioeconomic History    Marital status: Single    Number of children: 0   Tobacco Use    Smoking status: Former     Types: Cigarettes     Start date: 1/1/1976    Smokeless tobacco: Never   Vaping Use    Vaping status: Never Used   Substance and  Sexual Activity    Alcohol use: Not Currently     Comment: 7 days (1 drink)    Drug use: No    Sexual activity: Not Currently     Partners: Female       FAMILY HISTORY  Family History   Problem Relation Age of Onset    Heart attack Mother     Coronary artery disease Mother     Diabetes Mother     Vision loss Mother         Diabetic    Hypertension Father     Parkinsonism Father     No Known Problems Sister     Multiple myeloma Brother     Cancer Brother         Passed away in 2014    No Known Problems Brother     Colon cancer Neg Hx     Prostate cancer Neg Hx        IMMUNIZATION HISTORY  Immunization History   Administered Date(s) Administered    Flu Vaccine Quad PF >36MO 01/21/2019    FluMist 2-49yrs 03/10/2011, 10/16/2015    Fluzone (or Fluarix & Flulaval for VFC) >6mos 01/21/2019, 01/25/2021, 02/02/2022    Fluzone High Dose =>65 Years ([facility] ONLY) 01/16/2024    Fluzone Quad >6mos (Multi-dose) 10/01/2019    Pneumococcal Conjugate 20-Valent (PCV20) 09/15/2023    Pneumococcal Polysaccharide (PPSV23) 03/10/2011    Shingrix 12/20/2018, 04/15/2019    Td (TDVAX) 03/10/2011         REVIEW OF SYSTEMS     Review of Systems   Constitutional: Negative.    HENT: Negative.  Hearing loss: left.    Eyes: Negative.    Respiratory: Negative.     Cardiovascular: Negative.    Gastrointestinal: Negative.         Ulcerative colitis    Endocrine: Negative.    Genitourinary: Negative.    Musculoskeletal: Negative.    Skin: Negative.    Allergic/Immunologic: Negative.    Neurological: Negative.    Hematological: Negative.    Psychiatric/Behavioral: Negative.           PHYSICAL EXAMINATION     Physical Exam  Constitutional:       Appearance: Normal appearance.   HENT:      Head: Normocephalic.      Right Ear: Tympanic membrane, ear canal and external ear normal.      Left Ear: Tympanic membrane, ear canal and external ear normal.      Mouth/Throat:      Mouth: Mucous membranes are moist.      Pharynx: Oropharynx is clear.   Eyes:       General: No scleral icterus.     Conjunctiva/sclera: Conjunctivae normal.      Pupils: Pupils are equal, round, and reactive to light.   Neck:      Thyroid: No thyromegaly.      Vascular: No carotid bruit.      Trachea: No tracheal deviation.   Cardiovascular:      Rate and Rhythm: Normal rate and regular rhythm.      Pulses: Normal pulses.      Heart sounds: Normal heart sounds. No murmur heard.     No friction rub. No gallop.      Comments: No carotid bruits  Pulmonary:      Effort: Pulmonary effort is normal.      Breath sounds: Normal breath sounds.   Abdominal:      General: Abdomen is flat. There is no distension.      Palpations: Abdomen is soft. There is no mass.      Tenderness: There is no abdominal tenderness.      Hernia: No hernia is present.   Musculoskeletal:         General: Normal range of motion.      Cervical back: Neck supple.      Right lower leg: No edema.      Left lower leg: No edema.   Lymphadenopathy:      Cervical: No cervical adenopathy.      Upper Body:      Right upper body: No supraclavicular adenopathy.      Left upper body: No supraclavicular adenopathy.   Skin:     Coloration: Skin is not jaundiced or pale.      Findings: No erythema, lesion (Negative for suspicious skin lesions/growths) or rash.      Nails: There is no clubbing.      Comments: No suspicious skin lesions.    Neurological:      Mental Status: He is alert and oriented to person, place, and time.      Cranial Nerves: No cranial nerve deficit.      Motor: No abnormal muscle tone.      Deep Tendon Reflexes: Reflexes normal.   Psychiatric:         Mood and Affect: Mood normal.         Behavior: Behavior normal.         Thought Content: Thought content normal.         Judgment: Judgment normal.         REVIEWED DATA      Labs:    Lab Results   Component Value Date     03/13/2024    K 3.7 03/13/2024    CALCIUM 9.7 03/13/2024    AST 16 03/13/2024    ALT 16 03/13/2024    BUN 16 03/13/2024    CREATININE 1.26 03/13/2024  "   CREATININE 1.27 03/14/2023    CREATININE 1.39 (H) 01/26/2022    EGFRIFNONA 53 (L) 01/26/2022    EGFRIFAFRI 61 01/26/2022       Lab Results   Component Value Date    GLUCOSE 90 03/13/2024    HGBA1C 5.20 03/13/2024    HGBA1C 5.4 01/26/2022    HGBA1C 5.20 02/04/2019    TSH 4.390 (H) 03/13/2024    FREET4 1.31 03/13/2024       Lab Results   Component Value Date    PSA 2.080 03/13/2024    PSA 2.000 03/14/2023    PSA 1.5 01/26/2022       Lab Results   Component Value Date    TESTOSTEROTT 350 02/01/2016       Lab Results   Component Value Date    LDL 98 03/13/2024    HDL 56 03/13/2024    TRIG 82 03/13/2024    CHOLHDLRATIO 3.02 03/13/2024       No components found for: \"COIF674A\"    Lab Results   Component Value Date    WBC 4.36 03/13/2024    HGB 16.5 03/13/2024    MCV 95.4 03/13/2024     03/13/2024       Lab Results   Component Value Date    PROTEIN Negative 03/13/2024    GLUCOSEU Negative 03/13/2024    BLOODU Negative 03/13/2024    NITRITEU Negative 03/13/2024    LEUKOCYTESUR Negative 03/13/2024          Lab Results   Component Value Date    HEPCVIRUSABY <0.1 02/04/2019       Imaging:             Medical Tests:             Summary of old records / correspondence / consultant report:             Request outside records:         ASSESSMENT & PLAN     ANNUAL WELLNESS EXAM / PHYSICAL     Other medical problems addressed today:  Problem List Items Addressed This Visit          High    Essential hypertension (Chronic)    Overview     Continue amlodipine 5 mg qd          Current Assessment & Plan     BP Readings from Last 3 Encounters:   03/20/24 128/82   09/15/23 146/90   03/14/23 136/82      Continue amlodipine 5 mg QD          Relevant Medications    amLODIPine (NORVASC) 5 MG tablet       Medium    CKD (chronic kidney disease) (Chronic)    Current Assessment & Plan     Lab Results   Component Value Date    CREATININE 1.26 03/13/2024    CREATININE 1.27 03/14/2023    CREATININE 1.39 (H) 01/26/2022    CREATININE 1.29 " (H) 02/04/2019    CREATININE 1.23 08/08/2018    BUN 16 03/13/2024    EGFRIFNONA 53 (L) 01/26/2022    EGFRIFAFRI 61 01/26/2022      Stable. Monitor every 6 months.             Low    Ulcerative colitis (Chronic)    Overview     *Dr. Lund         Elevated TSH    Current Assessment & Plan     Lab Results   Component Value Date    TSH 4.390 (H) 03/13/2024    TSH 3.500 01/26/2022    TSH 3.690 02/04/2019    FREET4 1.31 03/13/2024    FREET4 1.32 01/26/2022    FREET4 1.24 02/04/2019      Elevated TSH with normal FT4. Recheck 1 year.           Other Visit Diagnoses       Encounter for annual health examination    -  Primary            Summary/Discussion:         Next Appointment with me: Visit date not found    Return in about 6 months (around 9/20/2024) for Reassess chronic medical problems.      HEALTHCARE MAINTENANCE ISSUES       Cancer Screening:  Colon: Initial/Next screening at age: CURRENT and -Colonoscopy  Repeat colon cancer screening: every 5 years  Prostate: Performed today and recommended annual screening  Testicular: Recommended monthly self exam  Skin: Monthly self skin examination, annual exam by health professional  Lung: Does not meet criteria for lung cancer screening.   Other:    Screening Labs & Tests:  Lab results reviewed & discussed with with patient or orders placed today.  EKG:  CV Screening: Lipid panel  DEXA (75+ or risk factors):   HEP C (If born [birth year]30-[birth year] or risk factors): Previously had negative screen  Other:     Immunization/Vaccinations (to be given today unless deferred by patient)  Influenza: Patient had the flu shot this season  Hepatitis A: Recommended here or at pharmacy  Hepatitis B: Not needed at this time  Tetanus/Pertussis: Recommended here or at pharmacy  Pneumococcal: Up to date  Shingles: Up to date  COVID: Already had the latest booster   RSV: Recommended vaccine at pharmacy  Lifestyle Counseling:  Lifestyle Modifications: Follow a low fat, low cholesterol diet, Maintain low  sodium diet (< 3 gm) for blood pressure, and Discussed sexual issues, safe sex practices, contraception  Safety Issues: Always wear seatbelt, Avoid texting while driving   Use sunscreen, regular skin examination  Recommended annual dental/vision examination.  Emotional/Stress/Sleep: Reviewed and  given when appropriate      Health Maintenance   Topic Date Due    RSV Vaccine - Adults (1 - 1-dose 60+ series) Never done    TDAP/TD VACCINES (3 - Td or Tdap) 03/11/2021    ANNUAL PHYSICAL  02/02/2023    AAA SCREEN (ONE-TIME)  Never done    COVID-19 Vaccine (1 - 2023-24 season) Never done    COLORECTAL CANCER SCREENING  12/02/2024    PROSTATE CANCER SCREENING  03/13/2025    INFLUENZA VACCINE  Completed    Pneumococcal Vaccine 65+  Completed    ZOSTER VACCINE  Completed    HEPATITIS C SCREENING  Discontinued

## 2024-03-20 NOTE — ASSESSMENT & PLAN NOTE
Lab Results   Component Value Date    CREATININE 1.26 03/13/2024    CREATININE 1.27 03/14/2023    CREATININE 1.39 (H) 01/26/2022    CREATININE 1.29 (H) 02/04/2019    CREATININE 1.23 08/08/2018    BUN 16 03/13/2024    EGFRIFNONA 53 (L) 01/26/2022    EGFRIFAFRI 61 01/26/2022      Stable. Monitor every 6 months.

## 2024-03-20 NOTE — ASSESSMENT & PLAN NOTE
BP Readings from Last 3 Encounters:   03/20/24 128/82   09/15/23 146/90   03/14/23 136/82      Continue amlodipine 5 mg QD

## 2024-03-20 NOTE — ASSESSMENT & PLAN NOTE
Lab Results   Component Value Date    TSH 4.390 (H) 03/13/2024    TSH 3.500 01/26/2022    TSH 3.690 02/04/2019    FREET4 1.31 03/13/2024    FREET4 1.32 01/26/2022    FREET4 1.24 02/04/2019      Elevated TSH with normal FT4. Recheck 1 year.

## 2024-09-16 DIAGNOSIS — I10 ESSENTIAL HYPERTENSION: Chronic | ICD-10-CM

## 2024-09-16 RX ORDER — AMLODIPINE BESYLATE 5 MG/1
TABLET ORAL
Qty: 90 TABLET | Refills: 1 | Status: SHIPPED | OUTPATIENT
Start: 2024-09-16 | End: 2024-09-18 | Stop reason: SDUPTHER

## 2024-09-18 ENCOUNTER — OFFICE VISIT (OUTPATIENT)
Dept: INTERNAL MEDICINE | Age: 69
End: 2024-09-18
Payer: COMMERCIAL

## 2024-09-18 VITALS
HEIGHT: 72 IN | BODY MASS INDEX: 22.35 KG/M2 | SYSTOLIC BLOOD PRESSURE: 132 MMHG | DIASTOLIC BLOOD PRESSURE: 98 MMHG | WEIGHT: 165 LBS | HEART RATE: 79 BPM | TEMPERATURE: 96.9 F | OXYGEN SATURATION: 97 %

## 2024-09-18 DIAGNOSIS — I10 ESSENTIAL HYPERTENSION: Primary | Chronic | ICD-10-CM

## 2024-09-18 PROCEDURE — 99213 OFFICE O/P EST LOW 20 MIN: CPT | Performed by: INTERNAL MEDICINE

## 2024-09-18 RX ORDER — AMLODIPINE BESYLATE 5 MG/1
5 TABLET ORAL DAILY
Qty: 90 TABLET | Refills: 1 | Status: SHIPPED | OUTPATIENT
Start: 2024-09-18

## 2024-11-08 VITALS
OXYGEN SATURATION: 98 % | RESPIRATION RATE: 16 BRPM | HEART RATE: 79 BPM | HEART RATE: 84 BPM | HEART RATE: 84 BPM | DIASTOLIC BLOOD PRESSURE: 73 MMHG | HEART RATE: 86 BPM | DIASTOLIC BLOOD PRESSURE: 105 MMHG | DIASTOLIC BLOOD PRESSURE: 105 MMHG | RESPIRATION RATE: 18 BRPM | RESPIRATION RATE: 15 BRPM | RESPIRATION RATE: 14 BRPM | OXYGEN SATURATION: 96 % | DIASTOLIC BLOOD PRESSURE: 73 MMHG | SYSTOLIC BLOOD PRESSURE: 115 MMHG | HEART RATE: 81 BPM | SYSTOLIC BLOOD PRESSURE: 116 MMHG | WEIGHT: 165 LBS | WEIGHT: 165 LBS | SYSTOLIC BLOOD PRESSURE: 177 MMHG | HEART RATE: 74 BPM | HEART RATE: 81 BPM | HEART RATE: 83 BPM | HEART RATE: 83 BPM | HEART RATE: 87 BPM | SYSTOLIC BLOOD PRESSURE: 101 MMHG | OXYGEN SATURATION: 99 % | DIASTOLIC BLOOD PRESSURE: 98 MMHG | HEART RATE: 79 BPM | SYSTOLIC BLOOD PRESSURE: 125 MMHG | DIASTOLIC BLOOD PRESSURE: 63 MMHG | OXYGEN SATURATION: 96 % | HEART RATE: 83 BPM | OXYGEN SATURATION: 96 % | DIASTOLIC BLOOD PRESSURE: 98 MMHG | RESPIRATION RATE: 17 BRPM | OXYGEN SATURATION: 97 % | RESPIRATION RATE: 9 BRPM | DIASTOLIC BLOOD PRESSURE: 73 MMHG | SYSTOLIC BLOOD PRESSURE: 101 MMHG | SYSTOLIC BLOOD PRESSURE: 125 MMHG | RESPIRATION RATE: 14 BRPM | DIASTOLIC BLOOD PRESSURE: 96 MMHG | HEART RATE: 87 BPM | SYSTOLIC BLOOD PRESSURE: 184 MMHG | TEMPERATURE: 97.9 F | SYSTOLIC BLOOD PRESSURE: 130 MMHG | SYSTOLIC BLOOD PRESSURE: 130 MMHG | SYSTOLIC BLOOD PRESSURE: 136 MMHG | TEMPERATURE: 96.5 F | RESPIRATION RATE: 12 BRPM | DIASTOLIC BLOOD PRESSURE: 105 MMHG | TEMPERATURE: 97.9 F | DIASTOLIC BLOOD PRESSURE: 63 MMHG | RESPIRATION RATE: 9 BRPM | RESPIRATION RATE: 15 BRPM | RESPIRATION RATE: 16 BRPM | RESPIRATION RATE: 14 BRPM | DIASTOLIC BLOOD PRESSURE: 75 MMHG | DIASTOLIC BLOOD PRESSURE: 80 MMHG | TEMPERATURE: 97.9 F | SYSTOLIC BLOOD PRESSURE: 115 MMHG | OXYGEN SATURATION: 97 % | SYSTOLIC BLOOD PRESSURE: 117 MMHG | TEMPERATURE: 97.9 F | SYSTOLIC BLOOD PRESSURE: 125 MMHG | OXYGEN SATURATION: 99 % | SYSTOLIC BLOOD PRESSURE: 136 MMHG | OXYGEN SATURATION: 98 % | RESPIRATION RATE: 16 BRPM | DIASTOLIC BLOOD PRESSURE: 63 MMHG | SYSTOLIC BLOOD PRESSURE: 177 MMHG | SYSTOLIC BLOOD PRESSURE: 156 MMHG | SYSTOLIC BLOOD PRESSURE: 115 MMHG | RESPIRATION RATE: 12 BRPM | DIASTOLIC BLOOD PRESSURE: 98 MMHG | DIASTOLIC BLOOD PRESSURE: 73 MMHG | OXYGEN SATURATION: 100 % | RESPIRATION RATE: 17 BRPM | SYSTOLIC BLOOD PRESSURE: 156 MMHG | HEART RATE: 79 BPM | RESPIRATION RATE: 15 BRPM | WEIGHT: 165 LBS | SYSTOLIC BLOOD PRESSURE: 159 MMHG | SYSTOLIC BLOOD PRESSURE: 177 MMHG | SYSTOLIC BLOOD PRESSURE: 117 MMHG | RESPIRATION RATE: 17 BRPM | DIASTOLIC BLOOD PRESSURE: 75 MMHG | SYSTOLIC BLOOD PRESSURE: 156 MMHG | OXYGEN SATURATION: 100 % | SYSTOLIC BLOOD PRESSURE: 115 MMHG | HEART RATE: 86 BPM | HEART RATE: 86 BPM | HEART RATE: 74 BPM | RESPIRATION RATE: 12 BRPM | WEIGHT: 165 LBS | SYSTOLIC BLOOD PRESSURE: 101 MMHG | HEIGHT: 72 IN | SYSTOLIC BLOOD PRESSURE: 116 MMHG | DIASTOLIC BLOOD PRESSURE: 96 MMHG | SYSTOLIC BLOOD PRESSURE: 117 MMHG | HEIGHT: 72 IN | SYSTOLIC BLOOD PRESSURE: 125 MMHG | HEART RATE: 81 BPM | HEART RATE: 76 BPM | RESPIRATION RATE: 9 BRPM | RESPIRATION RATE: 16 BRPM | HEART RATE: 84 BPM | RESPIRATION RATE: 9 BRPM | RESPIRATION RATE: 12 BRPM | RESPIRATION RATE: 15 BRPM | SYSTOLIC BLOOD PRESSURE: 159 MMHG | HEART RATE: 76 BPM | RESPIRATION RATE: 18 BRPM | OXYGEN SATURATION: 98 % | OXYGEN SATURATION: 96 % | DIASTOLIC BLOOD PRESSURE: 63 MMHG | OXYGEN SATURATION: 99 % | OXYGEN SATURATION: 96 % | TEMPERATURE: 96.5 F | HEART RATE: 86 BPM | OXYGEN SATURATION: 100 % | SYSTOLIC BLOOD PRESSURE: 177 MMHG | OXYGEN SATURATION: 99 % | RESPIRATION RATE: 9 BRPM | OXYGEN SATURATION: 100 % | OXYGEN SATURATION: 97 % | SYSTOLIC BLOOD PRESSURE: 130 MMHG | SYSTOLIC BLOOD PRESSURE: 177 MMHG | HEART RATE: 83 BPM | RESPIRATION RATE: 16 BRPM | RESPIRATION RATE: 14 BRPM | RESPIRATION RATE: 18 BRPM | HEART RATE: 87 BPM | OXYGEN SATURATION: 98 % | DIASTOLIC BLOOD PRESSURE: 98 MMHG | SYSTOLIC BLOOD PRESSURE: 117 MMHG | DIASTOLIC BLOOD PRESSURE: 105 MMHG | DIASTOLIC BLOOD PRESSURE: 80 MMHG | SYSTOLIC BLOOD PRESSURE: 116 MMHG | DIASTOLIC BLOOD PRESSURE: 96 MMHG | TEMPERATURE: 96.5 F | TEMPERATURE: 96.5 F | HEART RATE: 74 BPM | TEMPERATURE: 97.9 F | HEART RATE: 84 BPM | SYSTOLIC BLOOD PRESSURE: 177 MMHG | RESPIRATION RATE: 17 BRPM | SYSTOLIC BLOOD PRESSURE: 156 MMHG | SYSTOLIC BLOOD PRESSURE: 136 MMHG | SYSTOLIC BLOOD PRESSURE: 130 MMHG | HEART RATE: 86 BPM | DIASTOLIC BLOOD PRESSURE: 73 MMHG | DIASTOLIC BLOOD PRESSURE: 80 MMHG | SYSTOLIC BLOOD PRESSURE: 115 MMHG | TEMPERATURE: 96.5 F | DIASTOLIC BLOOD PRESSURE: 98 MMHG | OXYGEN SATURATION: 98 % | DIASTOLIC BLOOD PRESSURE: 63 MMHG | DIASTOLIC BLOOD PRESSURE: 94 MMHG | RESPIRATION RATE: 14 BRPM | HEART RATE: 86 BPM | DIASTOLIC BLOOD PRESSURE: 94 MMHG | SYSTOLIC BLOOD PRESSURE: 130 MMHG | RESPIRATION RATE: 10 BRPM | SYSTOLIC BLOOD PRESSURE: 116 MMHG | OXYGEN SATURATION: 100 % | RESPIRATION RATE: 9 BRPM | RESPIRATION RATE: 18 BRPM | RESPIRATION RATE: 10 BRPM | RESPIRATION RATE: 10 BRPM | HEIGHT: 72 IN | HEART RATE: 87 BPM | RESPIRATION RATE: 17 BRPM | RESPIRATION RATE: 10 BRPM | RESPIRATION RATE: 9 BRPM | HEART RATE: 76 BPM | OXYGEN SATURATION: 100 % | SYSTOLIC BLOOD PRESSURE: 101 MMHG | SYSTOLIC BLOOD PRESSURE: 136 MMHG | DIASTOLIC BLOOD PRESSURE: 80 MMHG | SYSTOLIC BLOOD PRESSURE: 184 MMHG | TEMPERATURE: 96.5 F | RESPIRATION RATE: 15 BRPM | DIASTOLIC BLOOD PRESSURE: 94 MMHG | HEART RATE: 86 BPM | OXYGEN SATURATION: 97 % | DIASTOLIC BLOOD PRESSURE: 94 MMHG | SYSTOLIC BLOOD PRESSURE: 125 MMHG | HEART RATE: 74 BPM | HEART RATE: 74 BPM | HEART RATE: 74 BPM | WEIGHT: 165 LBS | RESPIRATION RATE: 14 BRPM | DIASTOLIC BLOOD PRESSURE: 63 MMHG | RESPIRATION RATE: 17 BRPM | HEART RATE: 81 BPM | HEART RATE: 76 BPM | HEART RATE: 79 BPM | RESPIRATION RATE: 10 BRPM | OXYGEN SATURATION: 97 % | HEART RATE: 79 BPM | SYSTOLIC BLOOD PRESSURE: 101 MMHG | SYSTOLIC BLOOD PRESSURE: 184 MMHG | OXYGEN SATURATION: 97 % | DIASTOLIC BLOOD PRESSURE: 94 MMHG | DIASTOLIC BLOOD PRESSURE: 75 MMHG | DIASTOLIC BLOOD PRESSURE: 98 MMHG | SYSTOLIC BLOOD PRESSURE: 156 MMHG | OXYGEN SATURATION: 96 % | RESPIRATION RATE: 12 BRPM | DIASTOLIC BLOOD PRESSURE: 94 MMHG | RESPIRATION RATE: 10 BRPM | DIASTOLIC BLOOD PRESSURE: 75 MMHG | RESPIRATION RATE: 15 BRPM | DIASTOLIC BLOOD PRESSURE: 73 MMHG | HEART RATE: 76 BPM | RESPIRATION RATE: 10 BRPM | DIASTOLIC BLOOD PRESSURE: 96 MMHG | HEART RATE: 81 BPM | DIASTOLIC BLOOD PRESSURE: 73 MMHG | SYSTOLIC BLOOD PRESSURE: 184 MMHG | RESPIRATION RATE: 16 BRPM | SYSTOLIC BLOOD PRESSURE: 125 MMHG | HEIGHT: 72 IN | WEIGHT: 165 LBS | OXYGEN SATURATION: 98 % | OXYGEN SATURATION: 99 % | DIASTOLIC BLOOD PRESSURE: 96 MMHG | OXYGEN SATURATION: 98 % | HEART RATE: 81 BPM | HEART RATE: 79 BPM | HEART RATE: 84 BPM | DIASTOLIC BLOOD PRESSURE: 75 MMHG | SYSTOLIC BLOOD PRESSURE: 156 MMHG | HEIGHT: 72 IN | HEART RATE: 76 BPM | RESPIRATION RATE: 16 BRPM | OXYGEN SATURATION: 97 % | SYSTOLIC BLOOD PRESSURE: 159 MMHG | DIASTOLIC BLOOD PRESSURE: 80 MMHG | RESPIRATION RATE: 14 BRPM | DIASTOLIC BLOOD PRESSURE: 63 MMHG | RESPIRATION RATE: 17 BRPM | WEIGHT: 165 LBS | DIASTOLIC BLOOD PRESSURE: 75 MMHG | DIASTOLIC BLOOD PRESSURE: 75 MMHG | DIASTOLIC BLOOD PRESSURE: 105 MMHG | OXYGEN SATURATION: 100 % | RESPIRATION RATE: 18 BRPM | TEMPERATURE: 97.9 F | SYSTOLIC BLOOD PRESSURE: 115 MMHG | HEART RATE: 83 BPM | SYSTOLIC BLOOD PRESSURE: 115 MMHG | HEART RATE: 84 BPM | HEART RATE: 84 BPM | SYSTOLIC BLOOD PRESSURE: 159 MMHG | SYSTOLIC BLOOD PRESSURE: 159 MMHG | DIASTOLIC BLOOD PRESSURE: 80 MMHG | HEIGHT: 72 IN | TEMPERATURE: 96.5 F | SYSTOLIC BLOOD PRESSURE: 117 MMHG | SYSTOLIC BLOOD PRESSURE: 177 MMHG | HEART RATE: 79 BPM | SYSTOLIC BLOOD PRESSURE: 184 MMHG | HEART RATE: 83 BPM | DIASTOLIC BLOOD PRESSURE: 105 MMHG | SYSTOLIC BLOOD PRESSURE: 184 MMHG | RESPIRATION RATE: 18 BRPM | DIASTOLIC BLOOD PRESSURE: 96 MMHG | OXYGEN SATURATION: 96 % | RESPIRATION RATE: 18 BRPM | SYSTOLIC BLOOD PRESSURE: 136 MMHG | RESPIRATION RATE: 12 BRPM | HEART RATE: 81 BPM | SYSTOLIC BLOOD PRESSURE: 101 MMHG | SYSTOLIC BLOOD PRESSURE: 116 MMHG | RESPIRATION RATE: 12 BRPM | OXYGEN SATURATION: 99 % | DIASTOLIC BLOOD PRESSURE: 80 MMHG | SYSTOLIC BLOOD PRESSURE: 117 MMHG | SYSTOLIC BLOOD PRESSURE: 184 MMHG | HEART RATE: 83 BPM | SYSTOLIC BLOOD PRESSURE: 130 MMHG | SYSTOLIC BLOOD PRESSURE: 125 MMHG | SYSTOLIC BLOOD PRESSURE: 136 MMHG | SYSTOLIC BLOOD PRESSURE: 136 MMHG | TEMPERATURE: 97.9 F | SYSTOLIC BLOOD PRESSURE: 101 MMHG | SYSTOLIC BLOOD PRESSURE: 117 MMHG | HEART RATE: 87 BPM | HEIGHT: 72 IN | SYSTOLIC BLOOD PRESSURE: 159 MMHG | SYSTOLIC BLOOD PRESSURE: 159 MMHG | HEART RATE: 87 BPM | DIASTOLIC BLOOD PRESSURE: 96 MMHG | DIASTOLIC BLOOD PRESSURE: 94 MMHG | SYSTOLIC BLOOD PRESSURE: 130 MMHG | HEART RATE: 74 BPM | HEART RATE: 87 BPM | HEART RATE: 76 BPM | RESPIRATION RATE: 15 BRPM | SYSTOLIC BLOOD PRESSURE: 116 MMHG | OXYGEN SATURATION: 99 % | DIASTOLIC BLOOD PRESSURE: 98 MMHG | SYSTOLIC BLOOD PRESSURE: 156 MMHG | DIASTOLIC BLOOD PRESSURE: 105 MMHG | SYSTOLIC BLOOD PRESSURE: 116 MMHG

## 2024-11-13 ENCOUNTER — OFFICE (OUTPATIENT)
Age: 69
End: 2024-11-13

## 2024-11-13 ENCOUNTER — AMBULATORY SURGICAL CENTER (OUTPATIENT)
Dept: URBAN - METROPOLITAN AREA SURGERY 17 | Facility: SURGERY | Age: 69
End: 2024-11-13
Payer: COMMERCIAL

## 2024-11-13 ENCOUNTER — AMBULATORY SURGICAL CENTER (OUTPATIENT)
Age: 69
End: 2024-11-13
Payer: COMMERCIAL

## 2024-11-13 ENCOUNTER — OFFICE (OUTPATIENT)
Dept: URBAN - METROPOLITAN AREA PATHOLOGY 4 | Facility: PATHOLOGY | Age: 69
End: 2024-11-13

## 2024-11-13 DIAGNOSIS — Z09 ENCOUNTER FOR FOLLOW-UP EXAMINATION AFTER COMPLETED TREATMEN: ICD-10-CM

## 2024-11-13 DIAGNOSIS — Z92.25 PERSONAL HISTORY OF IMMUNOSUPPRESSION THERAPY: ICD-10-CM

## 2024-11-13 DIAGNOSIS — Z86.0102 PERSONAL HISTORY OF HYPERPLASTIC COLON POLYPS: ICD-10-CM

## 2024-11-13 DIAGNOSIS — K51.50 LEFT SIDED COLITIS WITHOUT COMPLICATIONS: ICD-10-CM

## 2024-11-13 DIAGNOSIS — K51.40 INFLAMMATORY POLYPS OF COLON WITHOUT COMPLICATIONS: ICD-10-CM

## 2024-11-13 DIAGNOSIS — K51.00 ULCERATIVE (CHRONIC) PANCOLITIS WITHOUT COMPLICATIONS: ICD-10-CM

## 2024-11-13 LAB
GI HISTOLOGY: I. RECTUM: (no result)
GI HISTOLOGY: PDF REPORT: (no result)
Lab: (no result)

## 2024-11-13 PROCEDURE — 88305 TISSUE EXAM BY PATHOLOGIST: CPT | Performed by: PATHOLOGY

## 2024-11-13 PROCEDURE — G0105 COLORECTAL SCRN; HI RISK IND: HCPCS | Performed by: INTERNAL MEDICINE

## 2025-03-18 ENCOUNTER — OFFICE VISIT (OUTPATIENT)
Dept: INTERNAL MEDICINE | Age: 70
End: 2025-03-18
Payer: MEDICARE

## 2025-03-18 VITALS
HEART RATE: 77 BPM | TEMPERATURE: 97.1 F | BODY MASS INDEX: 21.67 KG/M2 | WEIGHT: 160 LBS | HEIGHT: 72 IN | OXYGEN SATURATION: 99 % | DIASTOLIC BLOOD PRESSURE: 90 MMHG | SYSTOLIC BLOOD PRESSURE: 142 MMHG

## 2025-03-18 DIAGNOSIS — I10 WHITE COAT SYNDROME WITH DIAGNOSIS OF HYPERTENSION: Primary | Chronic | ICD-10-CM

## 2025-03-18 NOTE — ASSESSMENT & PLAN NOTE
Home blood pressure machine is overall accurate. Home blood pressure is excellent (actually low at times).   Continue amlodipine 5 mg daily for now.   Monitor blood pressure at peak exercise. Send readings in 1-2 weeks.     BP Readings from Last 3 Encounters:   03/18/25 142/90   09/18/24 132/98   03/20/24 128/82

## 2025-03-18 NOTE — PROGRESS NOTES
"        J  U  N  O  H    K  I  M ,   M  D       I  N  T  E  R  N  A  L    M  E  D  I  C  I  N  E         ENCOUNTER DATE:  03/18/2025    Alvin Barreraring / 69 y.o. / male    OFFICE VISIT ENCOUNTER       CHIEF COMPLAINT / REASON FOR OFFICE VISIT     Hypertension and Chronic Kidney Disease      ASSESSMENT & PLAN     Problem List Items Addressed This Visit          High    White coat syndrome with diagnosis of hypertension - Primary (Chronic)    Overview   03/18/25 Home BP machine evaluated. White coat syndrome confirmed.     Continue amlodipine 5 mg qd          Current Assessment & Plan   Home blood pressure machine is overall accurate. Home blood pressure is excellent (actually low at times).   Continue amlodipine 5 mg daily for now.   Monitor blood pressure at peak exercise. Send readings in 1-2 weeks.     BP Readings from Last 3 Encounters:   03/18/25 142/90   09/18/24 132/98   03/20/24 128/82             Relevant Medications    amLODIPine (NORVASC) 5 MG tablet     No orders of the defined types were placed in this encounter.    No orders of the defined types were placed in this encounter.      SUMMARY/DISCUSSION  Vaccine recommendations provided.       TOTAL TIME OF ENCOUNTER:        Next Appointment with me: Visit date not found     Return in about 6 months (around 9/18/2025) for WELCOME TO MEDICARE VISIT (30 MIN).      VITAL SIGNS     Vitals:    03/18/25 1124 03/18/25 1144   BP: 143/96 142/90   Pulse: 77    Temp: 97.1 °F (36.2 °C)    SpO2: 99%    Weight: 72.6 kg (160 lb)    Height: 182.9 cm (72.01\")        BP Readings from Last 3 Encounters:   03/18/25 142/90   09/18/24 132/98   03/20/24 128/82     Wt Readings from Last 3 Encounters:   03/18/25 72.6 kg (160 lb)   09/18/24 74.8 kg (165 lb)   03/20/24 75.3 kg (166 lb)     Body mass index is 21.69 kg/m².    Blood pressure readings recorded on patient flowsheet:       No data to display                  MEDICATIONS AT THE TIME OF OFFICE VISIT     Current Outpatient " Medications on File Prior to Visit   Medication Sig    amLODIPine (NORVASC) 5 MG tablet Take 1 tablet by mouth Daily.    Calcium Carbonate-Vitamin D (CALCIUM 600+D3 PO) Take  by mouth.    cetirizine (ZyrTEC) 10 MG tablet Take 1 tablet by mouth Daily.    Cholecalciferol 2000 UNITS capsule Take  by mouth Daily.    Probiotic Product (SUPER PROBIOTIC) capsule Take  by mouth.    mercaptopurine (PURINETHOL) 50 MG chemo tablet Take 1 tablet by mouth Daily. (Patient not taking: Reported on 3/18/2025)     No current facility-administered medications on file prior to visit.          HISTORY OF PRESENT ILLNESS     He has brought in his blood pressure machine from home and is overall deemed accurate.  At home his blood pressure is generally under 120/80 and at times below 110 systolically.  Under less than ideal situations his blood pressure may go up into the 130s systolically.  Denies chest pains, unusual headaches or vision change.  He is on amlodipine 5 mg daily without significant side effects.    Patient states that he has discontinued mercaptopurine for colitis and remains clinically stable.  Overall he feels better now that he has been off the medication.    Patient Care Team:  Jcarlos Tucker MD as PCP - General  Varsha Dunlap MD (Gastroenterology)    REVIEW OF SYSTEMS     Review of Systems       PHYSICAL EXAMINATION     Physical Exam  Alert with normal thought and judgment.   Cardiovascular: Normal rate, regular rhythm.   Pulm/Chest: Effort normal, breath sounds normal.       REVIEWED DATA     Labs:     Lab Results   Component Value Date     03/13/2024    K 3.7 03/13/2024    CALCIUM 9.7 03/13/2024    AST 16 03/13/2024    ALT 16 03/13/2024    BUN 16 03/13/2024    CREATININE 1.26 03/13/2024    CREATININE 1.27 03/14/2023    CREATININE 1.39 (H) 01/26/2022     Lab Results   Component Value Date    HGBA1C 5.20 03/13/2024    HGBA1C 5.4 01/26/2022    HGBA1C 5.20 02/04/2019   No results found for:  "\"MALBCRERATIO\"  Lab Results   Component Value Date    LDL 98 03/13/2024     (H) 01/26/2022    LDL 87 02/04/2019    HDL 56 03/13/2024    HDL 54 01/26/2022    TRIG 82 03/13/2024    TRIG 109 01/26/2022     Lab Results   Component Value Date    TSH 4.390 (H) 03/13/2024    TSH 3.500 01/26/2022    TSH 3.690 02/04/2019    FREET4 1.31 03/13/2024    FREET4 1.32 01/26/2022    FREET4 1.24 02/04/2019     Lab Results   Component Value Date    WBC 4.36 03/13/2024    HGB 16.5 03/13/2024     03/13/2024           Imaging:               Medical Tests:               Summary of old records / correspondence / consultant report:             Request outside records:       "